# Patient Record
Sex: FEMALE | Race: BLACK OR AFRICAN AMERICAN | NOT HISPANIC OR LATINO | Employment: UNEMPLOYED | URBAN - METROPOLITAN AREA
[De-identification: names, ages, dates, MRNs, and addresses within clinical notes are randomized per-mention and may not be internally consistent; named-entity substitution may affect disease eponyms.]

---
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
## 2019-01-07 ENCOUNTER — HOSPITAL ENCOUNTER (EMERGENCY)
Facility: HOSPITAL | Age: 19
Discharge: HOME | End: 2019-01-07
Attending: EMERGENCY MEDICINE
Payer: MEDICAID

## 2019-01-07 VITALS
BODY MASS INDEX: 42.68 KG/M2 | TEMPERATURE: 99 F | HEIGHT: 64 IN | WEIGHT: 250 LBS | SYSTOLIC BLOOD PRESSURE: 130 MMHG | DIASTOLIC BLOOD PRESSURE: 65 MMHG | HEART RATE: 90 BPM | RESPIRATION RATE: 20 BRPM | OXYGEN SATURATION: 98 %

## 2019-01-07 DIAGNOSIS — B34.9 VIRAL SYNDROME: Primary | ICD-10-CM

## 2019-01-07 DIAGNOSIS — R11.0 NAUSEA: ICD-10-CM

## 2019-01-07 LAB
B-HCG UR QL: NEGATIVE
BACTERIA #/AREA URNS HPF: ABNORMAL /HPF
BILIRUB UR QL STRIP: NEGATIVE
CLARITY UR: ABNORMAL
COLOR UR: YELLOW
CTP QC/QA: YES
DEPRECATED S PYO AG THROAT QL EIA: NEGATIVE
FLUAV AG SPEC QL IA: NEGATIVE
FLUBV AG SPEC QL IA: NEGATIVE
GLUCOSE UR QL STRIP: NEGATIVE
HGB UR QL STRIP: NEGATIVE
KETONES UR QL STRIP: NEGATIVE
LEUKOCYTE ESTERASE UR QL STRIP: NEGATIVE
MICROSCOPIC COMMENT: ABNORMAL
NITRITE UR QL STRIP: NEGATIVE
PH UR STRIP: 8 [PH] (ref 5–8)
PROT UR QL STRIP: NEGATIVE
RBC #/AREA URNS HPF: 0 /HPF (ref 0–4)
SP GR UR STRIP: 1.01 (ref 1–1.03)
SPECIMEN SOURCE: NORMAL
SQUAMOUS #/AREA URNS HPF: 30 /HPF
URN SPEC COLLECT METH UR: ABNORMAL
UROBILINOGEN UR STRIP-ACNC: ABNORMAL EU/DL
WBC #/AREA URNS HPF: 0 /HPF (ref 0–5)

## 2019-01-07 PROCEDURE — 25000003 PHARM REV CODE 250: Performed by: PHYSICIAN ASSISTANT

## 2019-01-07 PROCEDURE — 87081 CULTURE SCREEN ONLY: CPT

## 2019-01-07 PROCEDURE — 81000 URINALYSIS NONAUTO W/SCOPE: CPT

## 2019-01-07 PROCEDURE — 25000003 PHARM REV CODE 250: Performed by: EMERGENCY MEDICINE

## 2019-01-07 PROCEDURE — 87400 INFLUENZA A/B EACH AG IA: CPT

## 2019-01-07 PROCEDURE — 99284 EMERGENCY DEPT VISIT MOD MDM: CPT | Mod: 25

## 2019-01-07 PROCEDURE — 81025 URINE PREGNANCY TEST: CPT | Performed by: PHYSICIAN ASSISTANT

## 2019-01-07 PROCEDURE — 87880 STREP A ASSAY W/OPTIC: CPT

## 2019-01-07 PROCEDURE — 96360 HYDRATION IV INFUSION INIT: CPT

## 2019-01-07 RX ORDER — ACETAMINOPHEN 500 MG
1000 TABLET ORAL
Status: COMPLETED | OUTPATIENT
Start: 2019-01-07 | End: 2019-01-07

## 2019-01-07 RX ORDER — HYOSCYAMINE SULFATE 0.12 MG/1
0.12 TABLET SUBLINGUAL
Status: COMPLETED | OUTPATIENT
Start: 2019-01-07 | End: 2019-01-07

## 2019-01-07 RX ORDER — ONDANSETRON 4 MG/1
4 TABLET, ORALLY DISINTEGRATING ORAL EVERY 8 HOURS PRN
Qty: 15 TABLET | Refills: 3 | OUTPATIENT
Start: 2019-01-07 | End: 2023-08-06

## 2019-01-07 RX ORDER — KETOROLAC TROMETHAMINE 10 MG/1
10 TABLET, FILM COATED ORAL
Status: COMPLETED | OUTPATIENT
Start: 2019-01-07 | End: 2019-01-07

## 2019-01-07 RX ORDER — IBUPROFEN 800 MG/1
800 TABLET ORAL EVERY 6 HOURS PRN
Qty: 20 TABLET | Refills: 0 | Status: SHIPPED | OUTPATIENT
Start: 2019-01-07 | End: 2019-01-10

## 2019-01-07 RX ORDER — SODIUM CHLORIDE 9 MG/ML
1000 INJECTION, SOLUTION INTRAVENOUS
Status: COMPLETED | OUTPATIENT
Start: 2019-01-07 | End: 2019-01-07

## 2019-01-07 RX ADMIN — SODIUM CHLORIDE 1000 ML: 0.9 INJECTION, SOLUTION INTRAVENOUS at 03:01

## 2019-01-07 RX ADMIN — HYOSCYAMINE SULFATE 0.12 MG: 0.12 TABLET ORAL; SUBLINGUAL at 03:01

## 2019-01-07 RX ADMIN — KETOROLAC TROMETHAMINE 10 MG: 10 TABLET, FILM COATED ORAL at 02:01

## 2019-01-07 RX ADMIN — ACETAMINOPHEN 1000 MG: 500 TABLET ORAL at 02:01

## 2019-01-07 NOTE — ED PROVIDER NOTES
Encounter Date: 1/7/2019    SCRIBE #1 NOTE: I, Edwige Amador, am scribing for, and in the presence of,  Dr. Aguilar. I have scribed the entire note.       History     Chief Complaint   Patient presents with    Nausea     with stomach ache, cough, headache, chills, for the past 2 days.        Mason Tatum is a 18 y.o. female who  has a past medical history of Cataracts, both eyes. Patient presents to the ED with complaints of nausea, fatigue, weakness, headache, cough, and generalized body aches that began 2 days ago. Patient denies any sore throat, diarrhea, or fever prior to visiting the ED. Pt denies abd pain, having normal BMs, passing flatus. Pt denies any suspicious food intake, recent travel when explicitly asked.               The history is provided by the patient.     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 No pertinent family history.  Social History     Tobacco Use    Smoking status: Never Smoker    Smokeless tobacco: Never Used   Substance Use Topics    Alcohol use: No    Drug use: No     Review of Systems   Constitutional: Positive for fatigue. Negative for chills and fever.   HENT: Negative for congestion, rhinorrhea and sore throat.    Eyes: Negative for redness and visual disturbance.   Respiratory: Positive for cough. Negative for shortness of breath and wheezing.    Cardiovascular: Negative for chest pain and palpitations.   Gastrointestinal: Positive for nausea. Negative for abdominal pain, diarrhea and vomiting.   Genitourinary: Negative for dysuria and hematuria.   Musculoskeletal: Positive for myalgias. Negative for back pain and neck pain.   Skin: Negative for rash.   Neurological: Positive for headaches. Negative for dizziness, weakness and light-headedness.    Psychiatric/Behavioral: Negative for confusion.       Physical Exam     Initial Vitals [01/07/19 1430]   BP Pulse Resp Temp SpO2   (!) 141/72 (!) 111 18 (!) 102.7 °F (39.3 °C) 99 %      MAP       --         Physical Exam    Nursing note and vitals reviewed.  Constitutional: She appears well-developed and well-nourished. She is not diaphoretic. No distress.   HENT:   Head: Normocephalic and atraumatic.   Mouth/Throat: Oropharynx is clear and moist.   Bilateral TM erythematous.  Posterior pharynx clear.  Moist mucus membranes.   No facial tenderness.  Nose normal.   Eyes: Conjunctivae and EOM are normal. Pupils are equal, round, and reactive to light.   Neck: Normal range of motion. Neck supple.   No anterior cervical adenopathy.   Cardiovascular: Regular rhythm and normal heart sounds. Exam reveals no gallop and no friction rub.    No murmur heard.  Tachycardic.   Pulmonary/Chest: Breath sounds normal. She has no wheezes. She has no rhonchi. She has no rales.   Abdominal: Soft. Bowel sounds are normal. There is no tenderness. There is no rebound and no guarding.   Musculoskeletal: Normal range of motion. She exhibits no edema or tenderness.   Lymphadenopathy:     She has no cervical adenopathy.   Neurological: She is alert and oriented to person, place, and time. She has normal strength.   Skin: Skin is warm and dry. Capillary refill takes less than 2 seconds. No rash noted.         ED Course   Procedures  Labs Reviewed   URINALYSIS, REFLEX TO URINE CULTURE - Abnormal; Notable for the following components:       Result Value    Appearance, UA Cloudy (*)     Urobilinogen, UA 4.0-6.0 (*)     All other components within normal limits    Narrative:     Preferred Collection Type->Urine, Clean Catch   URINALYSIS MICROSCOPIC - Abnormal; Notable for the following components:    Bacteria, UA Many (*)     All other components within normal limits    Narrative:     Preferred Collection Type->Urine, Clean Catch   THROAT  SCREEN, RAPID   CULTURE, STREP A,  THROAT   INFLUENZA A AND B ANTIGEN   POCT URINE PREGNANCY          Imaging Results          X-Ray Chest PA And Lateral (Final result)  Result time 01/07/19 16:14:03    Final result by Gab Green MD (01/07/19 16:14:03)                 Impression:      Normal radiographic appearance of the chest.      Electronically signed by: Gab Green MD  Date:    01/07/2019  Time:    16:14             Narrative:    EXAMINATION:  XR CHEST PA AND LATERAL    CLINICAL HISTORY:  Nausea    TECHNIQUE:  PA and lateral views of the chest were performed.    COMPARISON:  Chest radiograph 09/12/2012    FINDINGS:  The lungs are clear, with normal appearance of pulmonary vasculature and no pleural effusion or pneumothorax.    The cardiac silhouette is normal in size. The hilar and mediastinal contours are unremarkable.    Bones are intact. Resolution is somewhat limited by body habitus with underpenetration.  Included upper abdomen is within normal limits.                                 Medical Decision Making:   Clinical Tests:   Lab Tests: Ordered and Reviewed  ED Management:  - POCT urine pregnancy negative   - Urinalysis cloudy with many bacteria, cloudy in appearance, but many squamous cells, no nitrite, no leuks; pt not complaining of dysuria   - Influenza antigen negative   - Rapid Strep swab negative  - CXR negative for acute cardiopulmonary process   - Pt's fever resolved with administration of antipyretic   - Pt able to tolerate PO; pt is non-toxic appearing; mucous membranes moist; pt in no acute distress   - Pt's symptoms and complaints suggestive of likely viral syndrome    - Will discharge pt with rx for Zofran, ibuprofen  - No further intervention is indicated at this time after having taken into account the patient's history, physical exam findings, and empirical and objective data obtained during the patient's emergency department workup.   - The patient is at low risk for an emergent  medical condition at this time, and I am of the belief that that it is safe to discharge the patient from the emergency department.   - The patient is instructed to follow up as outpatient as indicated on the discharge paperwork.    - I have discussed the specifics of the workup with the patient and the patient has verbalized understanding of the details of the workup, the diagnosis, the treatment plan, and the need for outpatient follow-up.    - Although the patient has no emergent etiology today this does not preclude the development of an emergent condition so, in addition, I have advised the patient that they can return to the ED and/or activate EMS at any time with worsening of their symptoms, change of their symptoms, or with any other medical complaint.    - The patient remained comfortable and stable during their visit in the ED.    - Discharge and follow-up instructions discussed with the patient who expressed understanding and willingness to comply with my recommendations.  - Results of all emergency department tests  discussed thoroughly with patient; all patient questions answered; pt in agreement with plan  - Pt instructed to follow up with PCP in one week for recheck of today's complaints  - Pt given strict emergency department return precautions for any new or worsening of symptoms  - Pt discharged from the emergency department in stable condition, in no acute distress                          Clinical Impression:     1. Viral syndrome    2. Nausea            Disposition:   Disposition: Discharged  Condition: Stable       I, Chele Aguilar,  personally performed the services described in this documentation. All medical record entries made by the scribe were at my direction and in my presence.  I have reviewed the chart and agree that the record reflects my personal performance and is accurate and complete. Chele Aguilar M.D. 4:08 PM01/09/2019                 Chele Aguilar MD  01/09/19  [id number]

## 2019-01-07 NOTE — ED NOTES
APPEARANCE: Alert, oriented and in no acute distress.  CARDIAC: Tachycardic rate and regular rhythm, no murmur heard.   PERIPHERAL VASCULAR: peripheral pulses present. Normal cap refill. No edema. Warm to touch.  Obese  RESPIRATORY:Normal rate and effort, breath sounds clear bilaterally throughout chest. Respirations are equal and unlabored no obvious signs of distress.  GASTRO: soft, bowel sounds normal, no tenderness, no abdominal distention. Obese. Nauseated  MUSC: Full ROM. No bony tenderness or soft tissue tenderness. No obvious deformity.  SKIN: Skin is hot and dry, normal skin turgor, mucous membranes moist.  NEURO: 5/5 strength major flexors/extensors bilaterally. Sensory intact to light touch bilaterally. [place] coma scale: eyes open spontaneously-4, oriented & converses-5, obeys commands-6. No neurological abnormalities.   MENTAL STATUS: awake, alert and aware of environment.  EYE: PERRL, both eyes: pupils brisk and reactive to light. Normal size.  ENT: EARS: no obvious drainage. NOSE: no active bleeding.   Pt complains of nausea, fever and general malaise.

## 2019-01-10 LAB — BACTERIA THROAT CULT: NORMAL

## 2019-02-28 ENCOUNTER — HOSPITAL ENCOUNTER (EMERGENCY)
Facility: HOSPITAL | Age: 19
Discharge: HOME | End: 2019-02-28
Attending: EMERGENCY MEDICINE
Payer: MEDICAID

## 2019-02-28 VITALS
RESPIRATION RATE: 16 BRPM | TEMPERATURE: 99 F | DIASTOLIC BLOOD PRESSURE: 77 MMHG | HEIGHT: 63 IN | WEIGHT: 250 LBS | SYSTOLIC BLOOD PRESSURE: 142 MMHG | HEART RATE: 90 BPM | OXYGEN SATURATION: 98 % | BODY MASS INDEX: 44.3 KG/M2

## 2019-02-28 DIAGNOSIS — R51.9 INCREASED FREQUENCY OF HEADACHES: Primary | ICD-10-CM

## 2019-02-28 LAB
B-HCG UR QL: NEGATIVE
CTP QC/QA: YES

## 2019-02-28 PROCEDURE — 81025 URINE PREGNANCY TEST: CPT | Performed by: NURSE PRACTITIONER

## 2019-02-28 PROCEDURE — 25000003 PHARM REV CODE 250: Performed by: NURSE PRACTITIONER

## 2019-02-28 PROCEDURE — 99283 EMERGENCY DEPT VISIT LOW MDM: CPT

## 2019-02-28 RX ORDER — ACETAMINOPHEN 325 MG/1
650 TABLET ORAL
Status: COMPLETED | OUTPATIENT
Start: 2019-02-28 | End: 2019-02-28

## 2019-02-28 RX ORDER — PROPARACAINE HYDROCHLORIDE 5 MG/ML
2 SOLUTION/ DROPS OPHTHALMIC
Status: DISCONTINUED | OUTPATIENT
Start: 2019-02-28 | End: 2019-02-28

## 2019-02-28 RX ADMIN — ACETAMINOPHEN 650 MG: 325 TABLET ORAL at 02:02

## 2019-02-28 NOTE — ED NOTES
APPEARANCE: Alert, oriented and in no acute distress.  CARDIAC: Normal rate and rhythm, no murmur heard.   PERIPHERAL VASCULAR: peripheral pulses present. Normal cap refill. No edema. Warm to touch.    RESPIRATORY:Normal rate and effort, breath sounds clear bilaterally throughout chest. Respirations are equal and unlabored no obvious signs of distress.  GASTRO: soft, bowel sounds normal, no tenderness, no abdominal distention.  MUSC: Full ROM. No bony tenderness or soft tissue tenderness. No obvious deformity.  SKIN: Skin is warm and dry, normal skin turgor, mucous membranes moist.  NEURO: 5/5 strength major flexors/extensors bilaterally. Sensory intact to light touch bilaterally. [place] coma scale: eyes open spontaneously-4, oriented & converses-5, obeys commands-6. No neurological abnormalities.   MENTAL STATUS: awake, alert and aware of environment.  ENT: EARS: no obvious drainage. NOSE: no active bleeding.   Pt complains of headache. Saw eye doctor today.

## 2019-02-28 NOTE — ED PROVIDER NOTES
"Encounter Date: 2/28/2019       History     Chief Complaint   Patient presents with    Headache     Reports intermittent headaches over the past week. Was seen by eye doctor today and told she has a "possible edema".      19-year-old female presents the ED for left-sided headache. Over the past week she reports increased frequency of her usual headaches.  The headaches do resolve after taking Aleve.  She saw her eye doctor today and was diagnosed with "possible edema" and was referred to a "head doctor" for further evaluation.  She reports that the 1st available appointment is in April, so she came to the ED today to find out what is going on.  She denies trauma, fever, visual changes, weakness, numbness/tingling, photophobia, neck pain/stiffness, and rash. She is tolerating oral fluids without difficulty.  She drove herself to the ED.  The headache has been waxing and waning over the past week.  The most pain is located at the temporal an and orbital area.  She reports history of cataract in his eye and has had no visual change from her usual.  No other complaints at this time.      The history is provided by the patient.     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 No pertinent family history.  Social History     Tobacco Use    Smoking status: Never Smoker    Smokeless tobacco: Never Used   Substance Use Topics    Alcohol use: No    Drug use: No     Review of Systems   Constitutional: Negative for activity change, appetite change, chills and fever.   HENT: Negative for congestion.    Eyes: Negative for photophobia, pain, redness and visual disturbance.   Respiratory: Negative for cough.    Cardiovascular: Negative for chest pain.   Gastrointestinal: Negative for abdominal pain, diarrhea, nausea and vomiting. "   Musculoskeletal: Negative for back pain and neck pain.   Skin: Negative for rash.   Allergic/Immunologic: Negative for immunocompromised state.   Neurological: Positive for headaches. Negative for dizziness, weakness, light-headedness and numbness.   Hematological: Does not bruise/bleed easily.   Psychiatric/Behavioral: Negative for confusion.   All other systems reviewed and are negative.      Physical Exam     Initial Vitals [02/28/19 1302]   BP Pulse Resp Temp SpO2   (!) 142/77 90 16 99.1 °F (37.3 °C) 98 %      MAP       --         Physical Exam    Nursing note and vitals reviewed.  Constitutional: Vital signs are normal. She appears well-developed and well-nourished. She is Obese . She is active and cooperative. She is easily aroused.  Non-toxic appearance. She does not have a sickly appearance. She does not appear ill. No distress.   HENT:   Head: Normocephalic and atraumatic.   Right Ear: External ear normal.   Left Ear: External ear normal.   Nose: Nose normal.   Mouth/Throat: Uvula is midline, oropharynx is clear and moist and mucous membranes are normal.   Eyes: Conjunctivae, EOM and lids are normal. Pupils are equal, round, and reactive to light.   No temporal artery tenderness.    Neck: Normal range of motion. Neck supple.   Cardiovascular: Normal rate, regular rhythm and normal heart sounds.   Pulmonary/Chest: Effort normal and breath sounds normal.   Abdominal: Soft. Normal appearance and bowel sounds are normal.   Neurological: She is alert, oriented to person, place, and time and easily aroused. She has normal strength. No cranial nerve deficit or sensory deficit. Coordination and gait normal. GCS eye subscore is 4. GCS verbal subscore is 5. GCS motor subscore is 6.   Skin: Skin is warm, dry and intact. No bruising and no rash noted. No erythema.   Psychiatric: She has a normal mood and affect. Her speech is normal and behavior is normal. Judgment and thought content normal. Cognition and memory are  "normal.         ED Course   Procedures  Labs Reviewed   POCT URINE PREGNANCY          Imaging Results    None          Medical Decision Making:   Initial Assessment:   19-year-old female presents the ED after learning that she may have edema in her eye.  She saw her optometrist today and was referred to Ophthalmology.  She reports increased frequency of headaches over the past week.  No trauma. Headaches relieved with NSAIDs.  Patient appears well, nontoxic.  Vitals stable.  ED Management:  Exam, Tylenol  Other:   I have discussed this case with another health care provider.       <> Summary of the Discussion: Discussed with Dr. Del Rio, the patient's ophthalmologist.  He believes the patient may have a swollen optic nerve, and refer the patient to an optometrist.  He did not refer the patient to the ED. He believes the patient may f/u with optometry as scheduled in April.   No indication for labs or imaging.  I do not suspect ICH, intracranial mass, or pseudotumor cerebri.  Patient to follow up with her ophthalmologist as scheduled in April.  Reviewed return precautions including worsening or changing pain, visual changes, weakness, numbness/tingling, vomiting, rash, or if she has any questions or concerns..  Patient verbalized understanding, compliance, and agreement with the treatment plan.  The patient ambulated out of the ED with steady gait.                   ED Course as of Feb 28 1339   Thu Feb 28, 2019   1304 This is a SORT/MSE of a 19 y.o. female presenting to the ED with c/o intermittent headache times a week.  Patient states that she went to the eye doctor today and was told that she had "possible edema."  Pertinent exam findings remarkable for elevated blood pressure. Care will be transferred to an alternate provider when patient is roomed for a full evaluation and final disposition. NYDIA Fuller 1:04 PM   [CH]      ED Course User Index  [CH] Hayes Willis NP     Clinical Impression:       " ICD-10-CM ICD-9-CM   1. Increased frequency of headaches R51 784.0                                YORDY Rivas  02/28/19 1557    Patient not seen by me. Patient seen by midlevel only.     Diana Minaya MD  02/28/19 5581

## 2019-04-18 ENCOUNTER — TELEPHONE (OUTPATIENT)
Dept: OPHTHALMOLOGY | Facility: CLINIC | Age: 19
End: 2019-04-18

## 2019-04-25 ENCOUNTER — HOSPITAL ENCOUNTER (EMERGENCY)
Facility: HOSPITAL | Age: 19
Discharge: HOME | End: 2019-04-26
Payer: MEDICAID

## 2019-04-25 VITALS
OXYGEN SATURATION: 99 % | TEMPERATURE: 99 F | HEART RATE: 77 BPM | DIASTOLIC BLOOD PRESSURE: 58 MMHG | WEIGHT: 240 LBS | SYSTOLIC BLOOD PRESSURE: 130 MMHG | BODY MASS INDEX: 42.52 KG/M2 | HEIGHT: 63 IN | RESPIRATION RATE: 18 BRPM

## 2019-04-25 DIAGNOSIS — R07.9 CHEST PAIN: ICD-10-CM

## 2019-04-25 DIAGNOSIS — K21.9 GASTROESOPHAGEAL REFLUX DISEASE, ESOPHAGITIS PRESENCE NOT SPECIFIED: Primary | ICD-10-CM

## 2019-04-25 LAB
B-HCG UR QL: NEGATIVE
CTP QC/QA: YES

## 2019-04-25 PROCEDURE — 99284 EMERGENCY DEPT VISIT MOD MDM: CPT | Mod: 25

## 2019-04-25 PROCEDURE — 93010 EKG 12-LEAD: ICD-10-PCS | Mod: ,,, | Performed by: INTERNAL MEDICINE

## 2019-04-25 PROCEDURE — 81025 URINE PREGNANCY TEST: CPT | Performed by: NURSE PRACTITIONER

## 2019-04-25 PROCEDURE — 93010 ELECTROCARDIOGRAM REPORT: CPT | Mod: ,,, | Performed by: INTERNAL MEDICINE

## 2019-04-25 PROCEDURE — 25000003 PHARM REV CODE 250: Performed by: NURSE PRACTITIONER

## 2019-04-25 PROCEDURE — 93005 ELECTROCARDIOGRAM TRACING: CPT

## 2019-04-25 RX ORDER — OMEPRAZOLE 20 MG/1
20 CAPSULE, DELAYED RELEASE ORAL DAILY
Qty: 30 CAPSULE | Refills: 0 | Status: SHIPPED | OUTPATIENT
Start: 2019-04-25 | End: 2019-05-25

## 2019-04-25 RX ORDER — KETOROLAC TROMETHAMINE 30 MG/ML
15 INJECTION, SOLUTION INTRAMUSCULAR; INTRAVENOUS
Status: DISCONTINUED | OUTPATIENT
Start: 2019-04-25 | End: 2019-04-25

## 2019-04-25 RX ADMIN — LIDOCAINE HYDROCHLORIDE: 20 SOLUTION ORAL; TOPICAL at 11:04

## 2019-04-26 NOTE — ED PROVIDER NOTES
"Encounter Date: 4/25/2019       History     Chief Complaint   Patient presents with    Chest Pain     Complaining of intermittent chest pain x 1 week.  States it feels like a needle sticking her.     Patient is a 19-year-old female who has past medical history who presents ED for evaluation of intermittent midsternal chest pain x1 week.  Denies injury or trauma.  Describes the pain as a "needle" sticking her.  Associates a little shortness of breath when she lies down.  Denies SOB at this time.  Denies OCP use or recent travel.  Denies any family history of cardiac issues.  Denies taking any medications for pain. Patient states that the pain got worse tonight after eating "macaroni and cheese and drinking sweet tea."  Denies fever, chills, neck pain/stiffness, dizziness, headache, cough/congestion, sore throat, nausea, vomiting, diarrhea, abdominal pain, dysuria, back pain, leg swelling, or any other concerns.    The history is provided by the patient.     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 No pertinent family history.  Social History     Tobacco Use    Smoking status: Never Smoker    Smokeless tobacco: Never Used   Substance Use Topics    Alcohol use: No    Drug use: No     Review of Systems   Constitutional: Negative for chills and fever.   HENT: Negative for congestion and sore throat.    Respiratory: Negative for cough and shortness of breath.    Cardiovascular: Positive for chest pain. Negative for leg swelling.   Gastrointestinal: Negative for abdominal pain, diarrhea, nausea and vomiting.   Musculoskeletal: Negative for back pain, neck pain and neck stiffness.   Neurological: Negative for dizziness and headaches.   Hematological: Does not bruise/bleed easily.   All other systems reviewed and are " negative.      Physical Exam     Initial Vitals [04/25/19 2200]   BP Pulse Resp Temp SpO2   134/75 85 18 98.7 °F (37.1 °C) 99 %      MAP       --         Physical Exam    Vitals reviewed.  Constitutional: She appears well-developed and well-nourished.  Non-toxic appearance. She does not have a sickly appearance.   HENT:   Head: Atraumatic.   Mouth/Throat: Oropharynx is clear and moist.   Eyes: EOM are normal.   Neck: Normal range of motion, full passive range of motion without pain and phonation normal. Neck supple.   Cardiovascular: Regular rhythm.   No LE edema.   Pulmonary/Chest: Effort normal and breath sounds normal. No respiratory distress.   TTP to mid sternal chest, reproducible.  No signs of injury or trauma.  No ecchymosis or bruising.   Abdominal: Soft. Normal appearance and bowel sounds are normal. There is no tenderness.   Neurological: She is alert and oriented to person, place, and time.   Skin: Skin is warm.   Psychiatric: She has a normal mood and affect.         ED Course   Procedures  Labs Reviewed   POCT URINE PREGNANCY          Imaging Results          X-Ray Chest PA And Lateral (Final result)  Result time 04/25/19 22:42:13    Final result by Xavier Davis MD (04/25/19 22:42:13)                 Impression:      No acute intrathoracic process identified.      Electronically signed by: Xavier Davis MD  Date:    04/25/2019  Time:    22:42             Narrative:    EXAMINATION:  XR CHEST PA AND LATERAL    CLINICAL HISTORY:  Chest pain, unspecified    TECHNIQUE:  PA and lateral views of the chest were performed.    COMPARISON:  01/07/2019.    FINDINGS:  Cardiac silhouette is normal in size.  Lungs are symmetrically expanded.  No evidence of focal consolidative process, pneumothorax, or significant effusion.  No acute osseous abnormality identified.                                 Medical Decision Making:   History:   Old Medical Records: I decided to obtain old medical records.  Initial  Assessment:   Patient presents ED for evaluation of intermittent midsternal chest pain x1 week.  Denies injury or trauma.  Appears well, toxic.  Afebrile. VSS.   Clinical Tests:   Lab Tests: Ordered and Reviewed  Radiological Study: Reviewed and Ordered  ED Management:  UPT, CXR, EKG, GI cocktail   - UPT negative.  - CXR shows no abnormalities.  - EKG unremarkable.   - Low suspicion for ACS, heart score 0.  - Low suspicion for PE, PERC 0.  - Patient reports complete resolution of pain after GI cocktail given in Ed.  - I do not think that the origin of patient's pain is cardiac in nature.  - Patient's signs and symptoms most likely due to GERD.   - Patient is HDS and will be DC home with prescription for Prilosec.  - Patient instructed to f/u with PCP or [facility] in 1-2 days and to return ED for any concerns or worsening symptoms, such as fever, increased chest pain or shortness of breath, or nonstop vomiting.  - Patient verbalized understanding, compliance, and agreement with treatment plan.  Other:   I discussed test(s) with the performing physician.    Additional MDM:   PERC Rule:   Age is greater than or equal to 50 = 0.0  Heart Rate is greater than or equal to 100 = 0.0  SaO2 on room air < 95% = 0.0  Unilateral leg swelling = 0.0  Hemoptysis = 0.0  Recent surgery or trauma = 0.0  Prior PE or DVT =  0.0  Hormone use = 0.00  PERC Score = 0  Heart Score:    History:          Slightly suspicious.  ECG:             Normal  Age:               Less than 45 years  Risk factors: no risk factors known  Troponin:       Less than or equal to normal limit  Final Score: 0                    ED Course as of Apr 26 0009   Thu Apr 25, 2019   2342 11:42 PM- Patient reports complete resolution of chest pain after GI cocktail.      [CH]      ED Course User Index  [CH] Hayes Willis NP     Clinical Impression:       ICD-10-CM ICD-9-CM   1. Gastroesophageal reflux disease, esophagitis presence not specified  K21.9 530.81   2. Chest pain R07.9 786.50                                Hayes Willis NP  04/26/19 0015

## 2019-04-26 NOTE — ED TRIAGE NOTES
Pt presents complaining of chest pain since Friday. Pt states the pain is 7/10 and feels like needles are poking her midsternal and into her L anterior chest wall. Pt denies SOB, injury to the area, or any other complaints at this time

## 2019-04-26 NOTE — ED NOTES
"Pt resting in stretcher,on cellphone, rr even and unlabored bilaterally. Pt states "I'm feeling a lot better, my pain is probably a 5 now". Call light within reach  "

## 2019-04-26 NOTE — DISCHARGE INSTRUCTIONS
Take prescribed medication as labeled.  Follow up with your PCP in 1-2 days or return to ED for any concerns or worsening symptoms, such as fever, increased chest pain or shortness of breath, or nonstop vomiting.

## 2019-09-16 ENCOUNTER — HOSPITAL ENCOUNTER (EMERGENCY)
Facility: HOSPITAL | Age: 19
Discharge: HOME | End: 2019-09-16
Attending: EMERGENCY MEDICINE
Payer: MEDICAID

## 2019-09-16 VITALS
BODY MASS INDEX: 44.29 KG/M2 | RESPIRATION RATE: 18 BRPM | WEIGHT: 250 LBS | OXYGEN SATURATION: 99 % | TEMPERATURE: 98 F | DIASTOLIC BLOOD PRESSURE: 79 MMHG | HEART RATE: 78 BPM | SYSTOLIC BLOOD PRESSURE: 122 MMHG

## 2019-09-16 DIAGNOSIS — K59.00 CONSTIPATION, UNSPECIFIED CONSTIPATION TYPE: Primary | ICD-10-CM

## 2019-09-16 LAB
B-HCG UR QL: NEGATIVE
BACTERIA #/AREA URNS HPF: ABNORMAL /HPF
BILIRUB UR QL STRIP: NEGATIVE
CLARITY UR: ABNORMAL
COLOR UR: YELLOW
CTP QC/QA: YES
GLUCOSE UR QL STRIP: NEGATIVE
HGB UR QL STRIP: NEGATIVE
KETONES UR QL STRIP: NEGATIVE
LEUKOCYTE ESTERASE UR QL STRIP: NEGATIVE
MICROSCOPIC COMMENT: ABNORMAL
NITRITE UR QL STRIP: NEGATIVE
PH UR STRIP: 7 [PH] (ref 5–8)
PROT UR QL STRIP: NEGATIVE
SP GR UR STRIP: 1.01 (ref 1–1.03)
SQUAMOUS #/AREA URNS HPF: 10 /HPF
URN SPEC COLLECT METH UR: ABNORMAL
UROBILINOGEN UR STRIP-ACNC: ABNORMAL EU/DL
WBC #/AREA URNS HPF: 3 /HPF (ref 0–5)

## 2019-09-16 PROCEDURE — 81025 URINE PREGNANCY TEST: CPT | Performed by: EMERGENCY MEDICINE

## 2019-09-16 PROCEDURE — 81000 URINALYSIS NONAUTO W/SCOPE: CPT

## 2019-09-16 PROCEDURE — 99282 EMERGENCY DEPT VISIT SF MDM: CPT

## 2019-09-16 RX ORDER — POLYETHYLENE GLYCOL 3350 17 G/17G
17 POWDER, FOR SOLUTION ORAL DAILY
Refills: 0 | COMMUNITY
Start: 2019-09-16 | End: 2023-08-06

## 2019-09-16 RX ORDER — SYRING-NEEDL,DISP,INSUL,0.3 ML 29 G X1/2"
296 SYRINGE, EMPTY DISPOSABLE MISCELLANEOUS ONCE
Refills: 0 | COMMUNITY
Start: 2019-09-16 | End: 2019-09-16

## 2019-09-17 NOTE — DISCHARGE INSTRUCTIONS
If 1 bottle of mag citrate does not result in a large bowel movement, take another one 4 hours later

## 2019-09-17 NOTE — ED PROVIDER NOTES
Encounter Date: 9/16/2019       History     Chief Complaint   Patient presents with    Abdominal Pain     abdominal pain x 4 days denies emesis     19-year-old female with presents the emergency room with 4 day history of not having a bowel movement.  She has also been having abdominal pain.  She denies any nausea or vomiting. Patient states that her last bowel movement was Friday and that was very hard like small suzanne.    The history is provided by the patient.     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 No pertinent family history.  Social History     Tobacco Use    Smoking status: Never Smoker    Smokeless tobacco: Never Used   Substance Use Topics    Alcohol use: No    Drug use: No     Review of Systems   Constitutional: Negative for fever.   HENT: Negative for sore throat.    Respiratory: Negative for shortness of breath.    Cardiovascular: Negative for chest pain.   Gastrointestinal: Negative for nausea.   Genitourinary: Negative for dysuria, vaginal bleeding, vaginal discharge and vaginal pain.   Musculoskeletal: Negative for back pain.   Skin: Negative for rash.   Neurological: Negative for weakness.   Hematological: Does not bruise/bleed easily.   All other systems reviewed and are negative.    Physical Exam     Initial Vitals [09/16/19 1901]   BP Pulse Resp Temp SpO2   136/80 74 20 98 °F (36.7 °C) 99 %      MAP       --         Physical Exam    Nursing note and vitals reviewed.  Constitutional: She appears well-developed and well-nourished.   HENT:   Head: Normocephalic and atraumatic.   Eyes: Conjunctivae and EOM are normal. Pupils are equal, round, and reactive to light.   Neck: Normal range of motion.   Cardiovascular: Normal rate, regular rhythm, normal heart sounds and intact distal pulses. Exam  reveals no gallop and no friction rub.    No murmur heard.  Pulmonary/Chest: Breath sounds normal. No respiratory distress. She has no wheezes. She has no rhonchi. She has no rales. She exhibits no tenderness.   Abdominal: Soft. Bowel sounds are normal. She exhibits no distension and no mass. There is no tenderness. There is no rebound and no guarding.   No abdominal pain on exam   Neurological: She is alert and oriented to person, place, and time. She has normal strength. GCS score is 15. GCS eye subscore is 4. GCS verbal subscore is 5. GCS motor subscore is 6.       ED Course   Procedures  Labs Reviewed   URINALYSIS, REFLEX TO URINE CULTURE - Abnormal; Notable for the following components:       Result Value    Appearance, UA Cloudy (*)     Urobilinogen, UA 2.0-3.0 (*)     All other components within normal limits    Narrative:     Preferred Collection Type->Urine, Clean Catch   URINALYSIS MICROSCOPIC - Abnormal; Notable for the following components:    Bacteria Many (*)     All other components within normal limits    Narrative:     Preferred Collection Type->Urine, Clean Catch   POCT URINE PREGNANCY           Medical Decision Making:   Initial Assessment:   19-year-old female with presents the emergency room with 4 day history of not having a bowel movement.  She has also been having abdominal pain.  She denies any nausea or vomiting. Patient states that her last bowel movement was Friday and that was very hard like small suzanne.    Differential Diagnosis:   Gastroenteritis, gastritis, ulcer, cholecystitis, gallstones, pancreatitis, ileus, small bowel obstruction, appendicitis, constipation    Clinical Tests:   Lab Tests: Ordered and Reviewed  The following lab test(s) were unremarkable: UPT and Urinalysis  ED Management:  Patient examined has a normal exam.  Based on patient's history she has history consistent with constipation.  She has been advised to take Mag citrate and MiraLax to help alleviate her  constipation.  Patient has been advised to return to the emergency room she has no improvement in her constipation is unable to have a bowel movement within the next 24 hr. Patient given strict return precautions and voiced understanding of all discharge instructions. Pt was stable at discharge.                        ED Course as of Sep 16 2153   Mon Sep 16, 2019   2054 BP: 136/80 [LD]   2054 Temp: 98 °F (36.7 °C) [LD]   2054 Pulse: 74 [LD]   2054 Resp: 20 [LD]   2054 SpO2: 99 % [LD]   2113 Bacteria, UA(!): Many [AT]   2113 Appearance, UA(!): Cloudy [AT]   2113 UROBILINOGEN UA(!): 2.0-3.0 [AT]   2113 Preg Test, Ur: Negative [AT]      ED Course User Index  [AT] YORDY Payne  [LD] Huong Bradley MD     Clinical Impression:       ICD-10-CM ICD-9-CM   1. Constipation, unspecified constipation type K59.00 564.00                                YORDY Payne  09/16/19 2155

## 2020-12-30 ENCOUNTER — HOSPITAL ENCOUNTER (EMERGENCY)
Facility: HOSPITAL | Age: 20
Discharge: HOME | End: 2020-12-30
Attending: EMERGENCY MEDICINE
Payer: MEDICAID

## 2020-12-30 VITALS
OXYGEN SATURATION: 100 % | HEART RATE: 82 BPM | DIASTOLIC BLOOD PRESSURE: 71 MMHG | SYSTOLIC BLOOD PRESSURE: 116 MMHG | BODY MASS INDEX: 40.97 KG/M2 | RESPIRATION RATE: 20 BRPM | TEMPERATURE: 98 F | WEIGHT: 240 LBS | HEIGHT: 64 IN

## 2020-12-30 DIAGNOSIS — R07.9 CHEST PAIN: ICD-10-CM

## 2020-12-30 LAB
B-HCG UR QL: NEGATIVE
BASOPHILS # BLD AUTO: 0.04 K/UL (ref 0–0.2)
BASOPHILS NFR BLD: 0.7 % (ref 0–1.9)
BUN BLD-MCNC: 12 MG/DL (ref 6–20)
CA-I BLDV-SCNC: 1.13 MMOL/L (ref 1.06–1.42)
CHLORIDE BLD-SCNC: 104 MMOL/L (ref 95–110)
CO2 BLD-SCNC: 26 MMOL/L (ref 23–29)
CREATININE: 0.9 MG/DL (ref 0.5–1.4)
CTP QC/QA: YES
D DIMER PPP IA.FEU-MCNC: 0.22 MG/L FEU
DIFFERENTIAL METHOD: ABNORMAL
EOSINOPHIL # BLD AUTO: 0.1 K/UL (ref 0–0.5)
EOSINOPHIL NFR BLD: 0.9 % (ref 0–8)
ERYTHROCYTE [DISTWIDTH] IN BLOOD BY AUTOMATED COUNT: 12.7 % (ref 11.5–14.5)
GLUCOSE BLD-MCNC: 85 MG/DL (ref 70–110)
HCT VFR BLD AUTO: 38.8 % (ref 37–48.5)
HGB BLD-MCNC: 12.3 G/DL (ref 12–16)
IMM GRANULOCYTES # BLD AUTO: 0.01 K/UL (ref 0–0.04)
IMM GRANULOCYTES NFR BLD AUTO: 0.2 % (ref 0–0.5)
LYMPHOCYTES # BLD AUTO: 2.3 K/UL (ref 1–4.8)
LYMPHOCYTES NFR BLD: 41 % (ref 18–48)
MCH RBC QN AUTO: 27.6 PG (ref 27–31)
MCHC RBC AUTO-ENTMCNC: 31.7 G/DL (ref 32–36)
MCV RBC AUTO: 87 FL (ref 82–98)
MONOCYTES # BLD AUTO: 0.5 K/UL (ref 0.3–1)
MONOCYTES NFR BLD: 8.3 % (ref 4–15)
NEUTROPHILS # BLD AUTO: 2.8 K/UL (ref 1.8–7.7)
NEUTROPHILS NFR BLD: 48.9 % (ref 38–73)
NRBC BLD-RTO: 0 /100 WBC
PLATELET # BLD AUTO: 316 K/UL (ref 150–350)
PMV BLD AUTO: 10.6 FL (ref 9.2–12.9)
POTASSIUM BLD-SCNC: 3.9 MMOL/L (ref 3.5–5.1)
RBC # BLD AUTO: 4.46 M/UL (ref 4–5.4)
SODIUM BLD-SCNC: 139 MMOL/L (ref 136–145)
TROPONIN I SERPL DL<=0.01 NG/ML-MCNC: <0.006 NG/ML (ref 0–0.03)
WBC # BLD AUTO: 5.64 K/UL (ref 3.9–12.7)

## 2020-12-30 PROCEDURE — 84484 ASSAY OF TROPONIN QUANT: CPT

## 2020-12-30 PROCEDURE — 93010 EKG 12-LEAD: ICD-10-PCS | Mod: ,,, | Performed by: INTERNAL MEDICINE

## 2020-12-30 PROCEDURE — 99285 EMERGENCY DEPT VISIT HI MDM: CPT | Mod: 25

## 2020-12-30 PROCEDURE — 63600175 PHARM REV CODE 636 W HCPCS: Performed by: EMERGENCY MEDICINE

## 2020-12-30 PROCEDURE — 85025 COMPLETE CBC W/AUTO DIFF WBC: CPT

## 2020-12-30 PROCEDURE — 81025 URINE PREGNANCY TEST: CPT | Performed by: EMERGENCY MEDICINE

## 2020-12-30 PROCEDURE — 80047 BASIC METABLC PNL IONIZED CA: CPT

## 2020-12-30 PROCEDURE — 93010 ELECTROCARDIOGRAM REPORT: CPT | Mod: ,,, | Performed by: INTERNAL MEDICINE

## 2020-12-30 PROCEDURE — 96374 THER/PROPH/DIAG INJ IV PUSH: CPT

## 2020-12-30 PROCEDURE — 85379 FIBRIN DEGRADATION QUANT: CPT

## 2020-12-30 PROCEDURE — 93005 ELECTROCARDIOGRAM TRACING: CPT

## 2020-12-30 RX ORDER — KETOROLAC TROMETHAMINE 30 MG/ML
30 INJECTION, SOLUTION INTRAMUSCULAR; INTRAVENOUS
Status: COMPLETED | OUTPATIENT
Start: 2020-12-30 | End: 2020-12-30

## 2020-12-30 RX ADMIN — KETOROLAC TROMETHAMINE 30 MG: 30 INJECTION, SOLUTION INTRAMUSCULAR at 07:12

## 2021-10-01 ENCOUNTER — HOSPITAL ENCOUNTER (EMERGENCY)
Facility: HOSPITAL | Age: 21
Discharge: HOME | End: 2021-10-01
Attending: EMERGENCY MEDICINE
Payer: MEDICAID

## 2021-10-01 VITALS
HEIGHT: 64 IN | TEMPERATURE: 100 F | OXYGEN SATURATION: 100 % | SYSTOLIC BLOOD PRESSURE: 148 MMHG | BODY MASS INDEX: 44.39 KG/M2 | WEIGHT: 260 LBS | RESPIRATION RATE: 17 BRPM | HEART RATE: 93 BPM | DIASTOLIC BLOOD PRESSURE: 93 MMHG

## 2021-10-01 DIAGNOSIS — J06.9 VIRAL URI: Primary | ICD-10-CM

## 2021-10-01 LAB
CTP QC/QA: YES
GROUP A STREP, MOLECULAR: NEGATIVE
SARS-COV-2 RDRP RESP QL NAA+PROBE: NEGATIVE

## 2021-10-01 PROCEDURE — 99283 EMERGENCY DEPT VISIT LOW MDM: CPT | Mod: 25

## 2021-10-01 PROCEDURE — U0002 COVID-19 LAB TEST NON-CDC: HCPCS | Performed by: PHYSICIAN ASSISTANT

## 2021-10-01 PROCEDURE — 87651 STREP A DNA AMP PROBE: CPT | Performed by: EMERGENCY MEDICINE

## 2021-12-24 ENCOUNTER — HOSPITAL ENCOUNTER (EMERGENCY)
Facility: HOSPITAL | Age: 21
Discharge: HOME | End: 2021-12-24
Attending: EMERGENCY MEDICINE
Payer: MEDICAID

## 2021-12-24 VITALS
WEIGHT: 260 LBS | OXYGEN SATURATION: 98 % | HEART RATE: 113 BPM | TEMPERATURE: 102 F | SYSTOLIC BLOOD PRESSURE: 131 MMHG | BODY MASS INDEX: 44.39 KG/M2 | DIASTOLIC BLOOD PRESSURE: 89 MMHG | HEIGHT: 64 IN | RESPIRATION RATE: 20 BRPM

## 2021-12-24 DIAGNOSIS — R50.9 FEVER, UNSPECIFIED FEVER CAUSE: ICD-10-CM

## 2021-12-24 DIAGNOSIS — U07.1 COVID-19: Primary | ICD-10-CM

## 2021-12-24 LAB
CTP QC/QA: YES
CTP QC/QA: YES
GROUP A STREP, MOLECULAR: NEGATIVE
POC MOLECULAR INFLUENZA A AGN: NEGATIVE
POC MOLECULAR INFLUENZA B AGN: NEGATIVE
SARS-COV-2 RDRP RESP QL NAA+PROBE: POSITIVE

## 2021-12-24 PROCEDURE — U0002 COVID-19 LAB TEST NON-CDC: HCPCS | Performed by: PHYSICIAN ASSISTANT

## 2021-12-24 PROCEDURE — 25000003 PHARM REV CODE 250: Performed by: PHYSICIAN ASSISTANT

## 2021-12-24 PROCEDURE — 99284 EMERGENCY DEPT VISIT MOD MDM: CPT | Mod: 25

## 2021-12-24 PROCEDURE — 87651 STREP A DNA AMP PROBE: CPT | Performed by: PHYSICIAN ASSISTANT

## 2021-12-24 RX ORDER — BENZONATATE 200 MG/1
200 CAPSULE ORAL 3 TIMES DAILY PRN
Qty: 30 CAPSULE | Refills: 0 | Status: SHIPPED | OUTPATIENT
Start: 2021-12-24 | End: 2022-01-03

## 2021-12-24 RX ORDER — PROMETHAZINE HYDROCHLORIDE AND DEXTROMETHORPHAN HYDROBROMIDE 6.25; 15 MG/5ML; MG/5ML
5 SYRUP ORAL EVERY 4 HOURS PRN
Qty: 120 ML | Refills: 0 | Status: SHIPPED | OUTPATIENT
Start: 2021-12-24 | End: 2022-01-03

## 2021-12-24 RX ORDER — ACETAMINOPHEN 500 MG
1000 TABLET ORAL
Status: COMPLETED | OUTPATIENT
Start: 2021-12-24 | End: 2021-12-24

## 2021-12-24 RX ORDER — CETIRIZINE HYDROCHLORIDE 10 MG/1
10 TABLET ORAL DAILY
Qty: 30 TABLET | Refills: 0 | OUTPATIENT
Start: 2021-12-24 | End: 2023-08-06

## 2021-12-24 RX ADMIN — ACETAMINOPHEN 1000 MG: 500 TABLET ORAL at 09:12

## 2021-12-25 NOTE — ED PROVIDER NOTES
Encounter Date: 12/24/2021       History     Chief Complaint   Patient presents with    COVID-19 Concerns     Patient presents to the ED with reports of having body aches, fever, chills, cough, and sore throat.      HPI: Mason Tatum, a 21 y.o. female  has a past medical history of Cataracts, both eyes.     She presents to the ED evaluation of body aches and fever as well a chills.  Unvaccinated for covid.  Unsure of exposure status.  No treatments tried.          The history is provided by the patient.     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  Social History     Tobacco Use    Smoking status: Never Smoker    Smokeless tobacco: Never Used   Substance Use Topics    Alcohol use: No    Drug use: No     Review of Systems   Constitutional: Positive for fatigue and fever.   HENT: Positive for sore throat. Negative for congestion.    Respiratory: Positive for cough.    Gastrointestinal: Negative for nausea and vomiting.   Musculoskeletal: Positive for myalgias.   Allergic/Immunologic: Negative for immunocompromised state.   All other systems reviewed and are negative.      Physical Exam     Initial Vitals [12/24/21 2030]   BP Pulse Resp Temp SpO2   131/89 (!) 113 20 (!) 102.2 °F (39 °C) 98 %      MAP       --         Physical Exam    Nursing note and vitals reviewed.  Constitutional: She appears well-developed and well-nourished. She is not diaphoretic. She appears distressed (tearful ).   HENT:   Head: Normocephalic and atraumatic.   Right Ear: External ear normal.   Left Ear: External ear normal.   Nose: Nose normal.   Eyes: Conjunctivae and EOM are normal.   Neck:   Normal range of motion.  Cardiovascular: Normal rate and regular rhythm.   Pulmonary/Chest: No respiratory distress.   Musculoskeletal:          General: Normal range of motion.      Cervical back: Normal range of motion.     Neurological: She is alert and oriented to person, place, and time.   Skin: No rash noted.   Psychiatric: She has a normal mood and affect. Thought content normal.         ED Course   Procedures  Labs Reviewed   SARS-COV-2 RDRP GENE - Abnormal; Notable for the following components:       Result Value    POC Rapid COVID Positive (*)     All other components within normal limits    Narrative:     This test utilizes isothermal nucleic acid amplification   technology to detect the SARS-CoV-2 RdRp nucleic acid segment.   The analytical sensitivity (limit of detection) is 125 genome   equivalents/mL.   A POSITIVE result implies infection with the SARS-CoV-2 virus;   the patient is presumed to be contagious.     A NEGATIVE result means that SARS-CoV-2 nucleic acids are not   present above the limit of detection. A NEGATIVE result should be   treated as presumptive. It does not rule out the possibility of   COVID-19 and should not be the sole basis for treatment decisions.   If COVID-19 is strongly suspected based on clinical and exposure   history, re-testing using an alternate molecular assay should be   considered.   This test is only for use under the Food and Drug   Administration s Emergency Use Authorization (EUA).   Commercial kits are provided by [employer].   Performance characteristics of the EUA have been independently   verified by [facility] Department of   Pathology and Laboratory Medicine.   _________________________________________________________________   The authorized Fact Sheet for Healthcare Providers and the authorized Fact   Sheet for Patients of the ID NOW COVID-19 are available on the FDA   website:     https://www.fda.gov/media/345883/download  https://www.fda.gov/media/035378/download         GROUP A STREP, MOLECULAR   POCT INFLUENZA A/B MOLECULAR   POCT URINE PREGNANCY          Imaging Results     None          Medications   acetaminophen tablet 1,000 mg (1,000 mg Oral Given 12/24/21 2121)     Medical Decision Making:   Initial Assessment:   URI symptoms   ED Management:  Covid positive.  Vital signs do not indicate sepsis, hypoxia nor respiratory distress, and in my professional opinion the patient is well enough for discharge home. The patient was provided with discharge instructions on self-care and how to quarantine at home. I reinforced this advice and the dangers to family and public with failure to comply. We will proceed with symptomatic treatment. The patient was also given a return to work note, if applicable. Return precautions discussed with the patient. The patient expressed understanding to my instructions.                         Clinical Impression:   Final diagnoses:  [U07.1] COVID-19 (Primary)  [R50.9] Fever, unspecified fever cause          ED Disposition Condition    Discharge Stable        ED Prescriptions     Medication Sig Dispense Start Date End Date Auth. Provider    cetirizine (ZYRTEC) 10 MG tablet Take 1 tablet (10 mg total) by mouth once daily. 30 tablet 12/24/2021 1/23/2022 Yadira Parra PA-C    promethazine-dextromethorphan (PROMETHAZINE-DM) 6.25-15 mg/5 mL Syrp Take 5 mLs by mouth every 4 (four) hours as needed (cough). 120 mL 12/24/2021 1/3/2022 Yadira Parra PA-C    benzonatate (TESSALON) 200 MG capsule Take 1 capsule (200 mg total) by mouth 3 (three) times daily as needed for Cough. 30 capsule 12/24/2021 1/3/2022 Yadira Parra PA-C        Follow-up Information     Follow up With Specialties Details Why Contact Info    Petra Marie MD Neonatology   [address] [place] [address]  [phone]             Yadira Parra PA-C  12/24/21 2137

## 2021-12-27 ENCOUNTER — NURSE TRIAGE (OUTPATIENT)
Dept: ADMINISTRATIVE | Facility: CLINIC | Age: 21
End: 2021-12-27
Payer: MEDICAID

## 2021-12-27 ENCOUNTER — HOSPITAL ENCOUNTER (EMERGENCY)
Facility: HOSPITAL | Age: 21
Discharge: HOME | End: 2021-12-27
Attending: EMERGENCY MEDICINE
Payer: MEDICAID

## 2021-12-27 VITALS
HEART RATE: 96 BPM | HEIGHT: 64 IN | RESPIRATION RATE: 20 BRPM | DIASTOLIC BLOOD PRESSURE: 87 MMHG | SYSTOLIC BLOOD PRESSURE: 129 MMHG | BODY MASS INDEX: 44.39 KG/M2 | TEMPERATURE: 101 F | WEIGHT: 260 LBS | OXYGEN SATURATION: 98 %

## 2021-12-27 DIAGNOSIS — M54.6 ACUTE BILATERAL THORACIC BACK PAIN: Primary | ICD-10-CM

## 2021-12-27 PROCEDURE — 99281 EMR DPT VST MAYX REQ PHY/QHP: CPT

## 2021-12-27 RX ORDER — ACETAMINOPHEN 500 MG
1000 TABLET ORAL
Status: DISCONTINUED | OUTPATIENT
Start: 2021-12-27 | End: 2021-12-27 | Stop reason: HOSPADM

## 2021-12-27 NOTE — ED PROVIDER NOTES
Encounter Date: 12/27/2021       History     Chief Complaint   Patient presents with    Back Pain     Pt presents covid+ since Friday and c/o mid-back pain. back pain 9/10 pain and no OTC meds taken.      22 y/o female which presents with back pain that began yesterday. She tested positive on Friday. Denies SOB or chest pain. Told by nurse triage line to come to the ED for evaluation.     The history is provided by the patient.     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  Social History     Tobacco Use    Smoking status: Never Smoker    Smokeless tobacco: Never Used   Substance Use Topics    Alcohol use: No    Drug use: No     Review of Systems   Constitutional: Positive for fever.   HENT: Negative for sore throat.    Respiratory: Negative for shortness of breath.    Cardiovascular: Negative for chest pain.   Gastrointestinal: Negative for nausea.   Genitourinary: Negative for dysuria.   Musculoskeletal: Positive for myalgias. Negative for back pain.   Skin: Negative for rash.   Neurological: Negative for weakness.   Hematological: Does not bruise/bleed easily.   All other systems reviewed and are negative.      Physical Exam     Initial Vitals [12/27/21 0840]   BP Pulse Resp Temp SpO2   129/87 96 20 (!) 100.9 °F (38.3 °C) 98 %      MAP       --         Physical Exam    Nursing note and vitals reviewed.  Constitutional: She appears well-developed and well-nourished.   HENT:   Head: Normocephalic and atraumatic.   Eyes: Conjunctivae and EOM are normal. Pupils are equal, round, and reactive to light.   Cardiovascular: Normal rate, regular rhythm, normal heart sounds and intact distal pulses. Exam reveals no gallop and no friction rub.    No murmur heard.  Pulmonary/Chest: Breath sounds normal. No respiratory  distress. She has no wheezes. She has no rhonchi. She has no rales. She exhibits no tenderness.   Musculoskeletal:         General: Tenderness present. Normal range of motion.      Cervical back: Normal.        Back:      Neurological: She is alert. She has normal strength. GCS score is 15. GCS eye subscore is 4. GCS verbal subscore is 5. GCS motor subscore is 6.   Skin: Skin is warm. Capillary refill takes less than 2 seconds.         ED Course   Procedures  Labs Reviewed - No data to display       Imaging Results    None          Medications   acetaminophen tablet 1,000 mg (has no administration in time range)     Medical Decision Making:   Initial Assessment:   22 y/o with COVID which presents with back pain.   Differential Diagnosis:   Muscle aches, fever, covid symptoms  ED Management:  Pt examined and has myalgias consistently with COVID. She has been advised to take ibuprofen, tylenol and to stay hydrated. Patient given strict return precautions and voiced understanding of all discharge instructions. Pt was stable at discharge.                           Clinical Impression:   Final diagnoses:  [M54.6] Acute bilateral thoracic back pain (Primary)          ED Disposition Condition    Discharge Stable        ED Prescriptions     None        Follow-up Information     Follow up With Specialties Details Why Contact Info    Petra Marie MD Neonatology Schedule an appointment as soon as possible for a visit  As needed [address] [place] [id number] [phone]             Yadira Mon, YORDY  12/27/21 0935

## 2021-12-28 NOTE — ED NOTES
APPEARANCE: Alert, oriented and in no acute distress.  CARDIAC: Normal rate and rhythm, no murmur heard.   PERIPHERAL VASCULAR: peripheral pulses present. Normal cap refill. No edema. Warm to touch.    RESPIRATORY:Normal rate and effort, breath sounds clear bilaterally throughout chest. Respirations are equal and unlabored no obvious signs of distress.  GASTRO: soft, bowel sounds normal, no tenderness, no abdominal distention.  MUSC: Full ROM. No bony tenderness or soft tissue tenderness. No obvious deformity. Pt has mid, lower back pain. No other complaints Covid+ but stable.  SKIN: Skin is warm and dry, normal skin turgor, mucous membranes moist.  NEURO: 5/5 strength major flexors/extensors bilaterally. Sensory intact to light touch bilaterally. Gertrudis coma scale: eyes open spontaneously-4, oriented & converses-5, obeys commands-6. No neurological abnormalities.   MENTAL STATUS: awake, alert and aware of environment.  EYE: PERRL, both eyes: pupils brisk and reactive to light. Normal size.  ENT: EARS: no obvious drainage. NOSE: no active bleeding.   BREAST: symmetrical. No masses. No tenderness.  GENITALIA: Normal external genitalia.   Pt to be seen and discharged by provider soon.

## 2021-12-29 ENCOUNTER — HOSPITAL ENCOUNTER (EMERGENCY)
Facility: HOSPITAL | Age: 21
Discharge: HOME | End: 2021-12-29
Attending: EMERGENCY MEDICINE
Payer: MEDICAID

## 2021-12-29 VITALS
RESPIRATION RATE: 18 BRPM | DIASTOLIC BLOOD PRESSURE: 94 MMHG | HEART RATE: 80 BPM | BODY MASS INDEX: 42.91 KG/M2 | OXYGEN SATURATION: 99 % | SYSTOLIC BLOOD PRESSURE: 142 MMHG | WEIGHT: 250 LBS | TEMPERATURE: 98 F

## 2021-12-29 DIAGNOSIS — U07.1 COVID-19: Primary | ICD-10-CM

## 2021-12-29 PROCEDURE — 99282 EMERGENCY DEPT VISIT SF MDM: CPT

## 2021-12-29 NOTE — Clinical Note
"Mason"Elaine Tatum was seen and treated in our emergency department on 12/29/2021.     COVID-19 is present in our communities across the state. There is limited testing for COVID at this time, so not all patients can be tested. In this situation, your employee meets the following criteria:    Mason Tatum has met the criteria for COVID-19 testing and has a POSITIVE result. She can return to work once they are asymptomatic for 72 hours without the use of fever reducing medications AND at least ten days from the first positive result.     If you have any questions or concerns, or if I can be of further assistance, please do not hesitate to contact me.    Sincerely,             Trever Finley MD"

## 2021-12-29 NOTE — Clinical Note
"Mason"Elaine Tatum was seen and treated in our emergency department on 12/29/2021.  She may return to work on 12/30/2021.       If you have any questions or concerns, please don't hesitate to call.      ANTOLIN Reyes RN    "

## 2021-12-30 NOTE — DISCHARGE INSTRUCTIONS
If you have a COVID Test PENDING:  Please access [facility] to review the results of your test. Until the results of your COVID test return, you should isolate yourself so as not to potentially spread illness to others.   If your COVID test returns positive, you should isolate yourself so as not to spread illness to others. After five full days, if you are feeling better and you have not had fever for 24 hours, you can return to your typical daily activities, but you must wear a mask around others for an additional 5 days.   If your COVID test returns negative and you are either unvaccinated or more than six months out from your two-dose vaccine and are not yet boosted, you should still quarantine for 5 full days followed by strict mask use for an additional 5 full days.   If your COVID test returns negative and you have received your 2-dose initial vaccine as well as a booster, you should continue strict mask use for 10 full days after the exposure.  For all those exposed, best practice includes a test at day 5 after the exposure. This can be a home test or a test through one of the many testing centers throughout our community.   Masking is always advised to limit the spread of COVID. Cdc.gov is an excellent site to obtain the latest up to date recommendations regarding COVID and COVID testing.     After your evaluation today, we ruled out any emergent condition. However, if you develop shortness of breath, chest pain, or ANY OTHER CONCERNS please return to the emergency department for further care.      CDC Testing and Quarantine Guidelines for patients with exposure to a known-positive COVID-19 person:  A close exposure is defined as anyone who has had an exposure (masked or unmasked) to a known COVID -19 positive person within 6 feet of someone for a cumulative total of 15 minutes or more over a 24-hour period.   Vaccinated and/or if you recently had a positive covid test within 90 days do NOT need to  quarantine after contact with someone who had COVID-19 unless you develop symptoms.   Fully vaccinated people who have not had a positive test within 90 days, should get tested 3-5 days after their exposure, even if they don't have symptoms and wear a mask indoors in public for 14 days following exposure or until their test result is negative.      Unvaccinated and/or NOT had a positive test within 90 days and meet close exposure  You are required by CDC guidelines to quarantine for at least 5 days from time of exposure followed by 5 days of strict masking. It is recommended, but not required to test after 5 days, unless you develop symptoms, in which case you should test at that time.  If you get tested after 5 days and your test is positive, your 5 day period of isolation starts the day of the positive test.    If your exposure does not meet the above definition, you can return to your normal daily activities to include social distancing, wearing a mask and frequent handwashing.      Here is a link to guidance from the CDC:  https://www.cdc.gov/media/releases/2021/s1227-isolation-quarantine-guidance.html      [facility] Testing Sites:  https://ldh.[place].gov/page/3934      [facility] website with testing locations and guidance:  https://www.[facility].org/selfcare

## 2021-12-30 NOTE — ED PROVIDER NOTES
Tele Dispo Note    Video connection - adequate  Provider location - [place]  Patient location-  Emergency Department    HPI  Mason Tatum, a nontoxic/well appearing, 21 y.o. female, presented to the ED with request for note to return to work.  Patient was previously positive on 12/24/2021, now without any symptoms.  Patient denies URI complaints itches rhinorrhea, congestion, sore throat, as well as cough, shortness of breath, fever/chills/myalgias.  The patient was seen virtually to reduce the risk of COVID exposure. Pt did have the choice to see an in person provider but was agreeable to virtual visit.      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    Nursing/Ancillary staff note reviewed.    ROS  Negative except as documented in history of present illness.      PE  Vitals:   Vitals:    12/29/21 2002   BP: (!) 142/94   Pulse: 80   Resp: 18   Temp: 97.7 °F (36.5 °C)   TempSrc: Oral   SpO2: 99%   Weight: 113.4 kg (250 lb)        Gen:   Well developed, well-nourished in NAD.  Awake, alert and oriented.   Nontoxic appearing  Well developed, hydrated and nourished.     Psych:   Normal behavior, normal affect.    HENT:   Normocephalic, atraumatic  Mucous membranes pink and moist  No hot potato voice      Resp:   Normal respiratory effort, Speaking in full sentences  No increased work of breathing  No signs of respiratory distress    CV:   Pt denies any chest pain    Neuro:  The patient is awake, alert and oriented to person, place, and time with normal speech.  Memory is normal and thought process is intact.   No gait abnormalities are appreciated.    Skin:   No observed rashes/bruises.      DIFFERENTIAL DIAGNOSIS: After history and discussion with patient a  differential diagnosis was considered, but was not limited to viral URI including influenza type illness, coronavirus, bronchitis, bacterial/viral pneumonia,  or reactive airway disease.      IMPRESSION:  The encounter diagnosis was COVID-19.       The patient did not have any signs of respiratory distress on video chat. The patient was able to speak in complete sentences without difficulty breathing. The patient was well appearing.     PLAN: Pt stable for discharge.  Patient provided with discharge instructions including quarantine and return to Work guidance per CDC guidelines.  Patient also or provided a work note explaining to return per CDC guidelines.  At present time, CDC guidance has 5 days from symptom onset. Pt will also wear mask for additional 5 days.     Trever Finley MD  12/29/21 2019

## 2022-05-24 ENCOUNTER — HOSPITAL ENCOUNTER (EMERGENCY)
Facility: HOSPITAL | Age: 22
Discharge: HOME | End: 2022-05-24
Attending: EMERGENCY MEDICINE
Payer: MEDICAID

## 2022-05-24 VITALS
HEIGHT: 64 IN | BODY MASS INDEX: 44.39 KG/M2 | OXYGEN SATURATION: 100 % | WEIGHT: 260 LBS | DIASTOLIC BLOOD PRESSURE: 86 MMHG | HEART RATE: 77 BPM | SYSTOLIC BLOOD PRESSURE: 131 MMHG | RESPIRATION RATE: 20 BRPM | TEMPERATURE: 99 F

## 2022-05-24 DIAGNOSIS — R51.9 ACUTE NONINTRACTABLE HEADACHE, UNSPECIFIED HEADACHE TYPE: Primary | ICD-10-CM

## 2022-05-24 LAB
B-HCG UR QL: NEGATIVE
CTP QC/QA: YES
SARS-COV-2 RDRP RESP QL NAA+PROBE: NEGATIVE

## 2022-05-24 PROCEDURE — 81025 URINE PREGNANCY TEST: CPT | Performed by: PHYSICIAN ASSISTANT

## 2022-05-24 PROCEDURE — U0002 COVID-19 LAB TEST NON-CDC: HCPCS | Performed by: PHYSICIAN ASSISTANT

## 2022-05-24 PROCEDURE — 63600175 PHARM REV CODE 636 W HCPCS: Performed by: EMERGENCY MEDICINE

## 2022-05-24 PROCEDURE — 96372 THER/PROPH/DIAG INJ SC/IM: CPT | Performed by: EMERGENCY MEDICINE

## 2022-05-24 PROCEDURE — 99284 EMERGENCY DEPT VISIT MOD MDM: CPT

## 2022-05-24 RX ORDER — METOCLOPRAMIDE 10 MG/1
10 TABLET ORAL EVERY 6 HOURS PRN
Qty: 14 TABLET | Refills: 0 | OUTPATIENT
Start: 2022-05-24 | End: 2023-08-06

## 2022-05-24 RX ORDER — BUTALBITAL, ACETAMINOPHEN AND CAFFEINE 50; 325; 40 MG/1; MG/1; MG/1
1 TABLET ORAL EVERY 6 HOURS PRN
Qty: 14 TABLET | Refills: 0 | Status: SHIPPED | OUTPATIENT
Start: 2022-05-24 | End: 2022-06-23

## 2022-05-24 RX ORDER — PROCHLORPERAZINE EDISYLATE 5 MG/ML
10 INJECTION INTRAMUSCULAR; INTRAVENOUS
Status: COMPLETED | OUTPATIENT
Start: 2022-05-24 | End: 2022-05-24

## 2022-05-24 RX ORDER — KETOROLAC TROMETHAMINE 30 MG/ML
30 INJECTION, SOLUTION INTRAMUSCULAR; INTRAVENOUS
Status: COMPLETED | OUTPATIENT
Start: 2022-05-24 | End: 2022-05-24

## 2022-05-24 RX ADMIN — PROCHLORPERAZINE EDISYLATE 10 MG: 5 INJECTION INTRAMUSCULAR; INTRAVENOUS at 07:05

## 2022-05-24 RX ADMIN — KETOROLAC TROMETHAMINE 30 MG: 30 INJECTION, SOLUTION INTRAMUSCULAR at 07:05

## 2022-05-24 NOTE — FIRST PROVIDER EVALUATION
Emergency Department TeleTriage Encounter Note      CHIEF COMPLAINT    Chief Complaint   Patient presents with    Headache     Headache in occipital region with blurry vision no dizziness, no n/v. Reported back and neck pain and weakness. No numbness or tingling noted. Ambulated with steady gait       VITAL SIGNS   Initial Vitals [05/24/22 1526]   BP Pulse Resp Temp SpO2   131/86 79 20 98.8 °F (37.1 °C) 100 %      MAP       --            ALLERGIES    Review of patient's allergies indicates:  No Known Allergies    PROVIDER TRIAGE NOTE  HPI: Mason Tatum, a 22 y.o. female presents to the ED for evaluation of HA x 2  -  3 days.  No fevers.  Tried Advil with little improvement.      Constitutional: Vital signs WNL, well nourished, well developed, appearing stated age, NAD   HEENT: NCAT, symmetrical lids, No obvious facial deformity.  Normal phonation. Normal Conjunctiva, Gross EOMs intact   Neck: NAROM   Respiratory: Normal effort.  No obvious use of accessory muscles   Musculoskeletal: Moved upper extremities well   Neuro: Alert, answers questions appropriately    Psych: appropriate mood and affect          ORDERS  Labs Reviewed - No data to display    ED Orders (720h ago, onward)    None            Virtual Visit Note: The provider triage portion of this emergency department evaluation and documentation was performed via zoidu, a HIPAA-compliant telemedicine application, in concert with a tele-presenter in the room. A face to face patient evaluation with one of my colleagues will occur once the patient is placed in an emergency department room.      DISCLAIMER: This note was prepared with [employer] voice recognition transcription software. Garbled syntax, mangled pronouns, and other bizarre constructions may be attributed to that software system.

## 2022-05-25 NOTE — ED TRIAGE NOTES
Pt reports to the ED with a complaint of a HA by her eyes and blurred vision.     Patient identifiers verified and correct.     APPEARANCE: Patient not in acute distress.  NEURO: Awake, alert, appropriate for age, condition, and situation, pupils equal, round, and reactive.   HEENT: Head symmetrical. Eyes bilateral.  Bilateral ears without drainage. Bilateral nares patent, throat clear.  CARDIAC: Regular rate and rhythm  RESPIRATORY: Airway is open and patent. Respirations are normal and spontaneous on room air..   NEUROVASCULAR: All extremities are warm and pink. .  MUSCULOSKELETAL: Moves all extremities.   SKIN: Warm and dry, adequate turgor, mucus membranes moist and pink; no breakdown, lesions, or ecchymosis noted.     Will continue to monitor.

## 2022-05-25 NOTE — ED PROVIDER NOTES
Encounter Date: 5/24/2022       History     Chief Complaint   Patient presents with    Headache     Headache in occipital region with blurry vision no dizziness, no n/v. Reported back and neck pain and weakness. No numbness or tingling noted. Ambulated with steady gait     21 y/o F presents to the ED c/o HA. Onset a few days ago. Patient reports pain was intermittent but has been constant today and yesterday. She has been taking ibuprofen 200mg twice daily without relief. Reports a posterior and frontal headache that is aching in nature, without eliciting, exacerbating, or alleviating factors. No other symptoms reported.  Denies any nausea, vomiting, lightheadedness, dizziness, sore throat, congestion, cough, chest pain, shortness of breath, abdominal pain.  No other symptoms reported at this time.         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  Social History     Tobacco Use    Smoking status: Never Smoker    Smokeless tobacco: Never Used   Substance Use Topics    Alcohol use: No    Drug use: No     Review of Systems   Constitutional: Negative for chills, fatigue and fever.   HENT: Negative for congestion and sore throat.    Eyes: Negative for photophobia and visual disturbance.   Respiratory: Negative for cough and shortness of breath.    Cardiovascular: Negative for chest pain and palpitations.   Gastrointestinal: Negative for abdominal pain, diarrhea and vomiting.   Musculoskeletal: Negative for back pain, neck pain and neck stiffness.   Neurological: Positive for headaches. Negative for light-headedness and numbness.       Physical Exam     Initial Vitals [05/24/22 1526]   BP Pulse Resp Temp SpO2   131/86 79 20 98.8 °F (37.1 °C) 100 %      MAP       --         Physical Exam    Nursing note and vitals  reviewed.  Constitutional: She appears well-developed and well-nourished. No distress.   HENT:   Head: Normocephalic and atraumatic.   Eyes: Conjunctivae and EOM are normal. Pupils are equal, round, and reactive to light.   Neck: Neck supple. No tracheal deviation present.   Normal range of motion.  Cardiovascular: Normal rate and intact distal pulses.   Pulmonary/Chest: No respiratory distress.   Musculoskeletal:         General: No tenderness or edema. Normal range of motion.      Cervical back: Normal range of motion and neck supple.     Neurological: She is alert and oriented to person, place, and time. She has normal strength. No cranial nerve deficit. GCS score is 15. GCS eye subscore is 4. GCS verbal subscore is 5. GCS motor subscore is 6.   Skin: Skin is warm and dry.         ED Course   Procedures  Labs Reviewed   SARS-COV-2 RNA AMPLIFICATION, QUAL   POCT URINE PREGNANCY          Imaging Results    None          Medications   ketorolac injection 30 mg (30 mg Intramuscular Given 5/24/22 1900)   prochlorperazine injection Soln 10 mg (10 mg Intramuscular Given 5/24/22 1900)     Medical Decision Making:   Initial Assessment:   22-year-old female presents emergency department complaining of headache  Differential Diagnosis:   Migraine versus tension versus cluster versus sinus headache  Clinical Tests:   Lab Tests: Reviewed       <> Summary of Lab: COVID, UPT negative  ED Management:  Patient given IM Toradol and Compazine with significant improvement in symptoms.  Informed her of plan to discharge with prescriptions for Reglan and Fioricet, instructed on home management, follow up with primary care physician, strict return precautions.  Vital signs stable, patient comfortable with discharge at this time.                       Clinical Impression:   Final diagnoses:  [R51.9] Acute nonintractable headache, unspecified headache type (Primary)          ED Disposition Condition    Discharge Stable        ED  Prescriptions     Medication Sig Dispense Start Date End Date Auth. Provider    butalbital-acetaminophen-caffeine -40 mg (FIORICET, ESGIC) -40 mg per tablet Take 1 tablet by mouth every 6 (six) hours as needed for Pain. 14 tablet 5/24/2022 6/23/2022 Juan Nixon MD    metoclopramide HCl (REGLAN) 10 MG tablet Take 1 tablet (10 mg total) by mouth every 6 (six) hours as needed (Nausea). 14 tablet 5/24/2022  Juan Nixon MD        Follow-up Information     Follow up With Specialties Details Why Contact Info    Petra Marie MD Neonatology Schedule an appointment as soon as possible for a visit   [address] [place] [id number] [phone]             Juan iNxon MD  05/24/22 2002

## 2022-05-25 NOTE — DISCHARGE INSTRUCTIONS
I recommend taking ibuprofen 600 mg every 8 hours with food in addition to the prescribed medication as needed for headache.  Please arrange for follow-up appointment with your primary care physician for further evaluation and management.  Please return with any new or worsening symptoms.

## 2022-08-08 ENCOUNTER — HOSPITAL ENCOUNTER (EMERGENCY)
Facility: HOSPITAL | Age: 22
Discharge: HOME | End: 2022-08-08
Attending: EMERGENCY MEDICINE
Payer: MEDICAID

## 2022-08-08 VITALS
SYSTOLIC BLOOD PRESSURE: 141 MMHG | RESPIRATION RATE: 16 BRPM | OXYGEN SATURATION: 99 % | HEART RATE: 87 BPM | WEIGHT: 250 LBS | DIASTOLIC BLOOD PRESSURE: 84 MMHG | BODY MASS INDEX: 42.91 KG/M2 | TEMPERATURE: 99 F

## 2022-08-08 DIAGNOSIS — M54.50 BILATERAL LOW BACK PAIN, UNSPECIFIED CHRONICITY, UNSPECIFIED WHETHER SCIATICA PRESENT: ICD-10-CM

## 2022-08-08 DIAGNOSIS — R10.2 SUPRAPUBIC PAIN: ICD-10-CM

## 2022-08-08 DIAGNOSIS — N30.01 ACUTE CYSTITIS WITH HEMATURIA: Primary | ICD-10-CM

## 2022-08-08 LAB
ALBUMIN SERPL BCP-MCNC: 4.2 G/DL (ref 3.5–5.2)
ALP SERPL-CCNC: 66 U/L (ref 55–135)
ALT SERPL W/O P-5'-P-CCNC: 13 U/L (ref 10–44)
ANION GAP SERPL CALC-SCNC: 10 MMOL/L (ref 8–16)
AST SERPL-CCNC: 16 U/L (ref 10–40)
B-HCG UR QL: NEGATIVE
BACTERIA #/AREA URNS HPF: ABNORMAL /HPF
BASOPHILS # BLD AUTO: 0.05 K/UL (ref 0–0.2)
BASOPHILS NFR BLD: 0.7 % (ref 0–1.9)
BILIRUB SERPL-MCNC: 0.6 MG/DL (ref 0.1–1)
BILIRUB UR QL STRIP: NEGATIVE
BUN SERPL-MCNC: 18 MG/DL (ref 6–20)
CALCIUM SERPL-MCNC: 8.9 MG/DL (ref 8.7–10.5)
CHLORIDE SERPL-SCNC: 108 MMOL/L (ref 95–110)
CLARITY UR: ABNORMAL
CO2 SERPL-SCNC: 19 MMOL/L (ref 23–29)
COLOR UR: YELLOW
CREAT SERPL-MCNC: 1 MG/DL (ref 0.5–1.4)
CTP QC/QA: YES
DIFFERENTIAL METHOD: ABNORMAL
EOSINOPHIL # BLD AUTO: 0.1 K/UL (ref 0–0.5)
EOSINOPHIL NFR BLD: 1 % (ref 0–8)
ERYTHROCYTE [DISTWIDTH] IN BLOOD BY AUTOMATED COUNT: 12.8 % (ref 11.5–14.5)
EST. GFR  (NO RACE VARIABLE): >60 ML/MIN/1.73 M^2
GLUCOSE SERPL-MCNC: 89 MG/DL (ref 70–110)
GLUCOSE UR QL STRIP: NEGATIVE
HCT VFR BLD AUTO: 40.6 % (ref 37–48.5)
HGB BLD-MCNC: 12.6 G/DL (ref 12–16)
HGB UR QL STRIP: ABNORMAL
HYALINE CASTS #/AREA URNS LPF: 0 /LPF
IMM GRANULOCYTES # BLD AUTO: 0.01 K/UL (ref 0–0.04)
IMM GRANULOCYTES NFR BLD AUTO: 0.1 % (ref 0–0.5)
KETONES UR QL STRIP: NEGATIVE
LEUKOCYTE ESTERASE UR QL STRIP: ABNORMAL
LYMPHOCYTES # BLD AUTO: 2.6 K/UL (ref 1–4.8)
LYMPHOCYTES NFR BLD: 37.6 % (ref 18–48)
MCH RBC QN AUTO: 27.6 PG (ref 27–31)
MCHC RBC AUTO-ENTMCNC: 31 G/DL (ref 32–36)
MCV RBC AUTO: 89 FL (ref 82–98)
MICROSCOPIC COMMENT: ABNORMAL
MONOCYTES # BLD AUTO: 0.6 K/UL (ref 0.3–1)
MONOCYTES NFR BLD: 8.1 % (ref 4–15)
NEUTROPHILS # BLD AUTO: 3.7 K/UL (ref 1.8–7.7)
NEUTROPHILS NFR BLD: 52.5 % (ref 38–73)
NITRITE UR QL STRIP: NEGATIVE
NRBC BLD-RTO: 0 /100 WBC
PH UR STRIP: 6 [PH] (ref 5–8)
PLATELET # BLD AUTO: 260 K/UL (ref 150–450)
PMV BLD AUTO: 10.4 FL (ref 9.2–12.9)
POTASSIUM SERPL-SCNC: 4.4 MMOL/L (ref 3.5–5.1)
PROT SERPL-MCNC: 7.4 G/DL (ref 6–8.4)
PROT UR QL STRIP: ABNORMAL
RBC # BLD AUTO: 4.57 M/UL (ref 4–5.4)
RBC #/AREA URNS HPF: 47 /HPF (ref 0–4)
SODIUM SERPL-SCNC: 137 MMOL/L (ref 136–145)
SP GR UR STRIP: 1.03 (ref 1–1.03)
SQUAMOUS #/AREA URNS HPF: 25 /HPF
UNIDENT CRYS URNS QL MICRO: 3
URN SPEC COLLECT METH UR: ABNORMAL
UROBILINOGEN UR STRIP-ACNC: NEGATIVE EU/DL
WBC # BLD AUTO: 6.95 K/UL (ref 3.9–12.7)
WBC #/AREA URNS HPF: 57 /HPF (ref 0–5)

## 2022-08-08 PROCEDURE — 80053 COMPREHEN METABOLIC PANEL: CPT | Performed by: EMERGENCY MEDICINE

## 2022-08-08 PROCEDURE — 81025 URINE PREGNANCY TEST: CPT | Performed by: NURSE PRACTITIONER

## 2022-08-08 PROCEDURE — 99284 EMERGENCY DEPT VISIT MOD MDM: CPT | Mod: 25

## 2022-08-08 PROCEDURE — 81000 URINALYSIS NONAUTO W/SCOPE: CPT | Performed by: EMERGENCY MEDICINE

## 2022-08-08 PROCEDURE — 85025 COMPLETE CBC W/AUTO DIFF WBC: CPT | Performed by: EMERGENCY MEDICINE

## 2022-08-08 PROCEDURE — 87086 URINE CULTURE/COLONY COUNT: CPT | Performed by: EMERGENCY MEDICINE

## 2022-08-08 RX ORDER — CEPHALEXIN 500 MG/1
500 CAPSULE ORAL EVERY 12 HOURS
Qty: 14 CAPSULE | Refills: 0 | Status: SHIPPED | OUTPATIENT
Start: 2022-08-08 | End: 2022-08-15

## 2022-08-08 RX ORDER — PHENAZOPYRIDINE HYDROCHLORIDE 200 MG/1
200 TABLET, FILM COATED ORAL 3 TIMES DAILY
Qty: 6 TABLET | Refills: 0 | Status: SHIPPED | OUTPATIENT
Start: 2022-08-08 | End: 2022-08-10

## 2022-08-08 RX ORDER — KETOROLAC TROMETHAMINE 10 MG/1
10 TABLET, FILM COATED ORAL EVERY 6 HOURS PRN
Qty: 20 TABLET | Refills: 0 | OUTPATIENT
Start: 2022-08-08 | End: 2023-08-06

## 2022-08-08 NOTE — FIRST PROVIDER EVALUATION
Emergency Department TeleTriage Encounter Note      CHIEF COMPLAINT    Chief Complaint   Patient presents with    Dysuria     X 3 weeks, + abd pain, denies n/v + lower back pain        VITAL SIGNS   Initial Vitals [08/08/22 1625]   BP Pulse Resp Temp SpO2   (!) 141/84 87 16 98.6 °F (37 °C) 99 %      MAP       --            ALLERGIES    Review of patient's allergies indicates:  No Known Allergies    PROVIDER TRIAGE NOTE  This is a teletriage evaluation of a 22 y.o. female presenting to the ED complaining of dysuria, lower abd pain, and hematuria for three weeks. Denies fever, vaginal bleeding, and vaginal discharge. Has had lower back pain.     Well-appearing, afebrile. No distress.     Initial orders will be placed and care will be transferred to an alternate provider when patient is roomed for a full evaluation. Any additional orders and the final disposition will be determined by that provider.           ORDERS  Labs Reviewed   URINALYSIS, REFLEX TO URINE CULTURE   URINALYSIS, REFLEX TO URINE CULTURE   POCT URINE PREGNANCY       ED Orders (720h ago, onward)    Start Ordered     Status Ordering Provider    08/08/22 1750 08/08/22 1749  Urinalysis, Reflex to Urine Culture Urine, Clean Catch  STAT         Ordered PATY HINES N.    08/08/22 1750 08/08/22 1749  POCT urine pregnancy  Once         Ordered PATY HINES N.    08/08/22 1648 08/08/22 1648  Urinalysis, Reflex to Urine Culture Urine, Clean Catch  STAT         In process AB [facility]            Virtual Visit Note: The provider triage portion of this emergency department evaluation and documentation was performed via [employer], a HIPAA-compliant telemedicine application, in concert with a tele-presenter in the room. A face to face patient evaluation with one of my colleagues will occur once the patient is placed in an emergency department room.      DISCLAIMER: This note was prepared with M*Modal voice recognition transcription  software. Garbled syntax, mangled pronouns, and other bizarre constructions may be attributed to that software system.

## 2022-08-09 ENCOUNTER — PATIENT OUTREACH (OUTPATIENT)
Dept: EMERGENCY MEDICINE | Facility: HOSPITAL | Age: 22
End: 2022-08-09
Payer: MEDICAID

## 2022-08-09 NOTE — ED TRIAGE NOTES
Patient identifiers verified and correct.     APPEARANCE: Patient not in acute distress.  NEURO: Awake, alert, appropriate for age, condition, and situation, pupils equal, round, and reactive.   HEENT: Head symmetrical. Eyes bilateral.  Bilateral ears without drainage. Bilateral nares patent, throat clear.  CARDIAC: Regular rate and rhythm  RESPIRATORY: Airway is open and patent. Respirations are normal and spontaneous on room air.   NEUROVASCULAR: All extremities are warm and pink. .  MUSCULOSKELETAL: Moves all extremities.   SKIN: Warm and dry, adequate turgor, mucus membranes moist and pink; no breakdown, lesions, or ecchymosis noted.     Will continue to monitor.

## 2022-08-09 NOTE — ED PROVIDER NOTES
Encounter Date: 8/8/2022       History     Chief Complaint   Patient presents with    Dysuria     X 3 weeks, + abd pain, denies n/v + lower back pain      Patient is a very pleasant 22-year-old female who presents to the ED with complaint of dysuria.  Patient reports dysuria for the past 3 weeks with associated lower suprapubic abdominal pain and bilateral lower back pain.  She does report hematuria as well that is gradually subsided.  She denies any fever, nausea, vomiting or diarrhea.  She denies any history of kidney stones, urinary tract infection vaginal discharge or abnormal vaginal bleeding.  Patient states that she was initially prescribed Macrobid for her symptoms but without any significant improvement.        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  Social History     Tobacco Use    Smoking status: Never Smoker    Smokeless tobacco: Never Used   Substance Use Topics    Alcohol use: No    Drug use: No     Review of Systems   Respiratory: Negative for chest tightness and shortness of breath.    Cardiovascular: Negative for chest pain, palpitations and leg swelling.   Gastrointestinal: Positive for abdominal pain. Negative for nausea and vomiting.   Genitourinary: Positive for dysuria, frequency and hematuria. Negative for difficulty urinating, vaginal bleeding, vaginal discharge and vaginal pain.   Musculoskeletal: Positive for back pain.   Skin: Negative for pallor and rash.   Neurological: Negative for dizziness and headaches.   Psychiatric/Behavioral: Negative for agitation and confusion.       Physical Exam     Initial Vitals [08/08/22 1625]   BP Pulse Resp Temp SpO2   (!) 141/84 87 16 98.6 °F (37 °C) 99 %      MAP       --         Physical Exam    Nursing note and vitals  reviewed.  Constitutional: She appears well-developed and well-nourished.   HENT:   Head: Normocephalic and atraumatic.   Right Ear: External ear normal.   Left Ear: External ear normal.   Eyes: Conjunctivae and EOM are normal. Pupils are equal, round, and reactive to light.   Neck: Neck supple.   Normal range of motion.  Cardiovascular: Normal rate, regular rhythm, normal heart sounds and intact distal pulses.   Pulmonary/Chest: Breath sounds normal.   Abdominal: Abdomen is soft. Bowel sounds are normal.   No CVA tenderness bilaterally   Musculoskeletal:      Cervical back: Normal range of motion and neck supple.      Lumbar back: Tenderness present. No swelling, edema or spasms. Normal range of motion.        Back:       Comments: Mild tenderness to the bilateral lower paraspinal lumbar musculature; no skin changes or rashes appreciated on exam; no bony tenderness or step-offs appreciated on palpation of the lumbar spine     Neurological: She is alert and oriented to person, place, and time. She has normal strength. GCS score is 15. GCS eye subscore is 4. GCS verbal subscore is 5. GCS motor subscore is 6.   No focal neurological deficits   Strength 5/5   Sensation grossly in tact   MAEW   GCS 15   AAOx3    Skin: Skin is warm and dry. Capillary refill takes less than 2 seconds.   Psychiatric: She has a normal mood and affect.         ED Course   Procedures  Labs Reviewed   URINALYSIS, REFLEX TO URINE CULTURE - Abnormal; Notable for the following components:       Result Value    Appearance, UA Hazy (*)     Protein, UA 1+ (*)     Occult Blood UA 1+ (*)     Leukocytes, UA 3+ (*)     All other components within normal limits    Narrative:     Specimen Source->Urine   URINALYSIS MICROSCOPIC - Abnormal; Notable for the following components:    RBC, UA 47 (*)     WBC, UA 57 (*)     All other components within normal limits    Narrative:     Specimen Source->Urine   CBC W/ AUTO DIFFERENTIAL - Abnormal; Notable for the  following components:    MCHC 31.0 (*)     All other components within normal limits   COMPREHENSIVE METABOLIC PANEL - Abnormal; Notable for the following components:    CO2 19 (*)     All other components within normal limits   CULTURE, URINE   POCT URINE PREGNANCY          Imaging Results          CT Renal Stone Study ABD Pelvis WO (Final result)  Result time 08/08/22 20:44:11    Final result by Xavier Davis MD (08/08/22 20:44:11)                 Impression:      No acute intra-abdominal abnormalities identified.  No evidence of renal stones or obstructive uropathy.      Electronically signed by: Xavier Davis MD  Date:    08/08/2022  Time:    20:44             Narrative:    EXAMINATION:  CT RENAL STONE STUDY ABD PELVIS WO    CLINICAL HISTORY:  Flank pain, kidney stone suspected;    TECHNIQUE:  Low dose axial images, sagittal and coronal reformations were obtained from the lung bases to the pubic symphysis.  Contrast was not administered.    COMPARISON:  CT abdomen and pelvis from August 2010.    FINDINGS:  The visualized portion of the heart is unremarkable.  The lung bases are clear.    No significant hepatic abnormality seen on this noncontrast exam.  There is no intra-or extrahepatic biliary ductal dilatation.  The gallbladder is unremarkable.  The stomach, pancreas, spleen, and adrenal glands are unremarkable.    Kidneys show no evidence of stones or hydronephrosis. Ureters are difficult to track, however no stones are seen along their expected courses.  Urinary bladder is unremarkable.  Uterus is retroverted.  No significant adnexal abnormalities are seen.    Appendix is visualized and is unremarkable.  The visualized loops of small and large bowel show no evidence of obstruction or inflammation.  No free air or free fluid.    Aorta tapers normally.    No acute osseous abnormality identified. Small fat containing umbilical hernia is noted.                                 Medications - No data to  display  Medical Decision Making:   Clinical Tests:   Lab Tests: Reviewed and Ordered  Radiological Study: Ordered and Reviewed  ED Management:  - VSS; pt afebrile; NAD  - UPT negative   - CBC w/diff H/H stable; no significant leukocytosis  - CMP without significant electrolyte abnormality; renal function WNL  - UA consistent with infection(57 WBCs)  Hematuria (47 RBCs)   - CT renal stone study demonstrates no acute intra-abdominal abnormality; no findings of kidney stones, pyelonephritis or other more significant intra-abdominal abnormality per final radiology read  - UCx ordered, pending  - will switch from Macrobid to Keflex; will also provide patient with rx for pyridium, ketorolac  - No further intervention is indicated at this time after having taken into account the patient's history, physical exam findings, and empirical and objective data obtained during the patient's emergency department workup.   - The patient is at low risk for an emergent medical condition at this time, and I am of the belief that that it is safe to discharge the patient from the emergency department.   - The patient is instructed to follow up as outpatient as indicated on the discharge paperwork.    - I have discussed the specifics of the workup with the patient and the patient has verbalized understanding of the details of the workup, the diagnosis, the treatment plan, and the need for outpatient follow-up.    - Although the patient has no emergent etiology today this does not preclude the development of an emergent condition so, in addition, I have advised the patient that they can return to the ED and/or activate EMS at any time with worsening of their symptoms, change of their symptoms, or with any other medical complaint.    - The patient remained comfortable and stable during their visit in the ED.    - Discharge and follow-up instructions discussed with the patient who expressed understanding and willingness to comply with my  recommendations.  - Results of all emergency department tests  discussed thoroughly with patient; all patient questions answered; pt in agreement with plan  - Pt instructed to follow up with PCP in 2-3 days for recheck of today's complaints  - Pt given strict emergency department return precautions for any new or worsening of symptoms  - Pt discharged from the emergency department in stable condition, in no acute distress    -                       Clinical Impression:   Final diagnoses:  [N30.01] Acute cystitis with hematuria (Primary)  [R10.2] Suprapubic pain  [M54.50] Bilateral low back pain, unspecified chronicity, unspecified whether sciatica present          ED Disposition Condition    Discharge Stable        ED Prescriptions     Medication Sig Dispense Start Date End Date Auth. Provider    cephALEXin (KEFLEX) 500 MG capsule Take 1 capsule (500 mg total) by mouth every 12 (twelve) hours. for 7 days 14 capsule 8/8/2022 8/15/2022 Chele Aguilar MD    phenazopyridine (PYRIDIUM) 200 MG tablet Take 1 tablet (200 mg total) by mouth 3 (three) times daily. for 2 days 6 tablet 8/8/2022 8/10/2022 Chele Aguilar MD    ketorolac (TORADOL) 10 mg tablet Take 1 tablet (10 mg total) by mouth every 6 (six) hours as needed for Pain. 20 tablet 8/8/2022  Chele Aguilar MD        Follow-up Information     Follow up With Specialties Details Why Contact Info    Petra Marie MD Neonatology Schedule an appointment as soon as possible for a visit   [address] [place] [id number] [phone]             Chele Aguilar MD  08/08/22 2053       Chele Aguilar MD  08/08/22 2056

## 2022-08-10 LAB
BACTERIA UR CULT: NORMAL
BACTERIA UR CULT: NORMAL

## 2023-04-19 ENCOUNTER — PATIENT MESSAGE (OUTPATIENT)
Dept: RESEARCH | Facility: HOSPITAL | Age: 23
End: 2023-04-19
Payer: MEDICAID

## 2023-06-18 ENCOUNTER — OFFICE VISIT (OUTPATIENT)
Dept: URGENT CARE | Facility: CLINIC | Age: 23
End: 2023-06-18
Payer: MEDICAID

## 2023-06-18 VITALS
HEIGHT: 64 IN | RESPIRATION RATE: 18 BRPM | HEART RATE: 79 BPM | BODY MASS INDEX: 42.68 KG/M2 | OXYGEN SATURATION: 96 % | TEMPERATURE: 98 F | DIASTOLIC BLOOD PRESSURE: 81 MMHG | SYSTOLIC BLOOD PRESSURE: 116 MMHG | WEIGHT: 250 LBS

## 2023-06-18 DIAGNOSIS — J02.9 VIRAL PHARYNGITIS: Primary | ICD-10-CM

## 2023-06-18 LAB
CTP QC/QA: YES
CTP QC/QA: YES
MOLECULAR STREP A: NEGATIVE
SARS-COV-2 AG RESP QL IA.RAPID: NEGATIVE

## 2023-06-18 PROCEDURE — 87811 SARS-COV-2 COVID19 W/OPTIC: CPT | Mod: QW,S$GLB,, | Performed by: NURSE PRACTITIONER

## 2023-06-18 PROCEDURE — 87811 SARS CORONAVIRUS 2 ANTIGEN POCT, MANUAL READ: ICD-10-PCS | Mod: QW,S$GLB,, | Performed by: NURSE PRACTITIONER

## 2023-06-18 PROCEDURE — 87651 STREP A DNA AMP PROBE: CPT | Mod: QW,S$GLB,, | Performed by: NURSE PRACTITIONER

## 2023-06-18 PROCEDURE — 99203 OFFICE O/P NEW LOW 30 MIN: CPT | Mod: S$GLB,,, | Performed by: NURSE PRACTITIONER

## 2023-06-18 PROCEDURE — 87651 POCT STREP A MOLECULAR: ICD-10-PCS | Mod: QW,S$GLB,, | Performed by: NURSE PRACTITIONER

## 2023-06-18 PROCEDURE — 99203 PR OFFICE/OUTPT VISIT, NEW, LEVL III, 30-44 MIN: ICD-10-PCS | Mod: S$GLB,,, | Performed by: NURSE PRACTITIONER

## 2023-06-18 RX ORDER — IBUPROFEN 800 MG/1
800 TABLET ORAL EVERY 8 HOURS PRN
COMMUNITY
Start: 2023-05-29 | End: 2023-08-06

## 2023-06-18 RX ORDER — MUPIROCIN 20 MG/G
OINTMENT TOPICAL 3 TIMES DAILY
COMMUNITY
Start: 2023-03-06 | End: 2023-10-11

## 2023-06-18 RX ORDER — VALACYCLOVIR HYDROCHLORIDE 1 G/1
1000 TABLET, FILM COATED ORAL
COMMUNITY
Start: 2023-05-21 | End: 2023-10-11

## 2023-06-18 RX ORDER — AMOXICILLIN 500 MG/1
500 CAPSULE ORAL 3 TIMES DAILY
COMMUNITY
Start: 2023-05-29 | End: 2023-10-11

## 2023-06-18 RX ORDER — TRIAMCINOLONE ACETONIDE 1 MG/G
CREAM TOPICAL 2 TIMES DAILY
COMMUNITY
Start: 2023-05-10 | End: 2023-10-11

## 2023-06-18 NOTE — PROGRESS NOTES
"Subjective:      Patient ID: Mason Tatum is a 23 y.o. female.    Vitals:  height is 5' 4" (1.626 m) and weight is 113.4 kg (250 lb). Her temperature is 97.7 °F (36.5 °C). Her blood pressure is 116/81 and her pulse is 79. Her respiration is 18 and oxygen saturation is 96%.     Chief Complaint: Sore Throat    This is a 23 y.o. female who presents today with a chief complaint of sore throat, Patient presents to clinic with left side of throat pain x 3 days.  Patient took ibuprofen for the pain as she was already taking it along with antibiotics from having a tooth pulled two weeks ago.  Patient reports she had her tooth pulled and currently on amoxicillin and does have ibuprofen that she taking for pain, patient reports she did had her tooth pulled on the right side, and having sore throat on the left side, reports able to eat and drink normal, no hot potato voice, no drooling,  denies fever, body aches or chills, denies cough, wheezing or shortness of breath, denies nausea, vomiting, diarrhea or abdominal pain, denies chest pain or dizziness positional lightheadedness, denies sore throat or trouble swallowing, denies loss of taste or smell, or any other symptoms        Sore Throat   This is a new problem. The current episode started in the past 7 days. The problem has been gradually worsening. The pain is worse on the left side. There has been no fever. The pain is at a severity of 8/10. The pain is moderate. Associated symptoms include trouble swallowing. Pertinent negatives include no congestion, headaches or neck pain. She has tried acetaminophen for the symptoms. The treatment provided no relief.     HENT:  Positive for sore throat and trouble swallowing. Negative for congestion.    Neck: Negative for neck pain.   Neurological:  Negative for headaches.    Objective:     Physical Exam   Constitutional: She is oriented to person, place, and time. She appears well-developed. She is cooperative.  Non-toxic " appearance. She does not appear ill. No distress.   HENT:   Head: Normocephalic and atraumatic.   Ears:   Right Ear: Hearing, tympanic membrane, external ear and ear canal normal.   Left Ear: Hearing, tympanic membrane, external ear and ear canal normal.   Nose: Nose normal. No mucosal edema, rhinorrhea or nasal deformity. No epistaxis. Right sinus exhibits no maxillary sinus tenderness and no frontal sinus tenderness. Left sinus exhibits no maxillary sinus tenderness and no frontal sinus tenderness.   Mouth/Throat: Uvula is midline, oropharynx is clear and moist and mucous membranes are normal. No trismus in the jaw. Normal dentition. No uvula swelling. No oropharyngeal exudate, posterior oropharyngeal edema, posterior oropharyngeal erythema, tonsillar abscesses or cobblestoning.   No oropharyngeal erythema, edema or exudate noted, no Gokul's or trismus, uvula midline, able to tolerate secretions      Comments: No oropharyngeal erythema, edema or exudate noted, no Gokul's or trismus, uvula midline, able to tolerate secretions  Eyes: Conjunctivae and lids are normal. No scleral icterus.   Neck: Trachea normal and phonation normal. Neck supple. No edema present. No erythema present. No neck rigidity present.   Cardiovascular: Normal rate, regular rhythm, normal heart sounds and normal pulses.   Pulmonary/Chest: Effort normal and breath sounds normal. No respiratory distress. She has no decreased breath sounds. She has no rhonchi.   Abdominal: Normal appearance.   Musculoskeletal: Normal range of motion.         General: No deformity. Normal range of motion.   Lymphadenopathy:     She has no cervical adenopathy.   Neurological: She is alert and oriented to person, place, and time. She exhibits normal muscle tone. Coordination normal.   Skin: Skin is warm, dry, intact, not diaphoretic and not pale.   Psychiatric: Her speech is normal and behavior is normal. Judgment and thought content normal.   Nursing note and  vitals reviewed.  Results for orders placed or performed in visit on 06/18/23   POCT Strep A, Molecular   Result Value Ref Range    Molecular Strep A, POC Negative Negative     Acceptable Yes    SARS Coronavirus 2 Antigen, POCT Manual Read   Result Value Ref Range    SARS Coronavirus 2 Antigen Negative Negative     Acceptable Yes          Patient in no acute distress.  Vitals reassuring.  Discussed results/diagnosis/plan in depth with patient in clinic. Strict precautions given to patient to monitor for worsening signs and symptoms. Advised to follow up with primary.All questions answered. Strict ER precautions given. If your symptoms worsens or fail to improve you should go to the Emergency Room. Discharge and follow-up instructions given verbally/printed. Discharge and follow-up instructions discussed with the patient who expressed understanding and willingness to comply with my recommendations.Patient voiced understanding and in agreement with current treatment plan.     Please be advised this text was dictated with [employer] software and may contain errors due to translation.    Assessment:     1. Viral pharyngitis        Plan:       Viral pharyngitis  -     POCT Strep A, Molecular  -     SARS Coronavirus 2 Antigen, POCT Manual Read          Medical Decision Making:   Clinical Tests:   Lab Tests: Reviewed  Urgent Care Management:  Patient in no acute distress.  Vitals reassuring.  On exam, patient is nontoxic appearing and afebrile.  Lungs CTA.  No oropharyngeal erythema, edema or exudate noted.  No Gokul's or trismus.  Uvula midline.  Able to tolerate secretions.  Negative strep and COVID-19 results discussed with patient in detail.  Patient will continue the amoxicillin prescribed by dentist and the NSAIDs for pain.  Medication prescribed and over-the-counter medication discussed with patient at length.  Proper hydration advised.  I reiterated the importance of further evaluation  if no improvement symptoms and follow-up with primary. Patient voiced understanding and in agreement with current treatment plan.             Patient Instructions   PLEASE READ YOUR DISCHARGE INSTRUCTIONS ENTIRELY AS IT CONTAINS IMPORTANT INFORMATION.      Please drink plenty of fluids.    Please get plenty of rest.    Please return here or go to the Emergency Department for any concerns or worsening of condition.    Please take an over the counter antihistamine medication (allegra/Claritin/Zyrtec) of your choice as directed.    Try an over the counter decongestant like Mucinex D or Sudafed. You buy this behind the pharmacy counter    If you do have Hypertension or palpitations, it is safe to take Coricidin HBP for relief of sinus symptoms.    If not allergic, please take over the counter Tylenol (Acetaminophen) and/or Motrin (Ibuprofen) as directed for control of pain and/or fever.  Please follow up with your primary care doctor or specialist as needed.    Sore throat recommendations: Warm fluids, warm salt water gargles, throat lozenges, tea, honey, soup, rest, hydration.    Use over the counter flonase: one spray each nostril twice daily OR two sprays each nostril once daily.     If you  smoke, please stop smoking.      Please return or see your primary care doctor if you develop new or worsening symptoms.     Please arrange follow up with your primary medical clinic as soon as possible. You must understand that you've received an Urgent Care treatment only and that you may be released before all of your medical problems are known or treated. You, the patient, will arrange for follow up as instructed. If your symptoms worsen or fail to improve you should go to the Emergency Room.

## 2023-08-06 ENCOUNTER — HOSPITAL ENCOUNTER (EMERGENCY)
Facility: HOSPITAL | Age: 23
Discharge: HOME | End: 2023-08-06
Attending: EMERGENCY MEDICINE
Payer: MEDICAID

## 2023-08-06 VITALS
HEART RATE: 78 BPM | BODY MASS INDEX: 42.91 KG/M2 | WEIGHT: 250 LBS | TEMPERATURE: 98 F | RESPIRATION RATE: 18 BRPM | SYSTOLIC BLOOD PRESSURE: 174 MMHG | DIASTOLIC BLOOD PRESSURE: 86 MMHG | OXYGEN SATURATION: 99 %

## 2023-08-06 DIAGNOSIS — S39.012A STRAIN OF LUMBAR REGION, INITIAL ENCOUNTER: Primary | ICD-10-CM

## 2023-08-06 LAB
B-HCG UR QL: NEGATIVE
CTP QC/QA: YES

## 2023-08-06 PROCEDURE — 99284 EMERGENCY DEPT VISIT MOD MDM: CPT

## 2023-08-06 PROCEDURE — 81025 URINE PREGNANCY TEST: CPT | Performed by: EMERGENCY MEDICINE

## 2023-08-06 RX ORDER — NAPROXEN 500 MG/1
500 TABLET ORAL 2 TIMES DAILY WITH MEALS
Qty: 14 TABLET | Refills: 0 | Status: SHIPPED | OUTPATIENT
Start: 2023-08-06 | End: 2023-08-13

## 2023-08-06 RX ORDER — METHOCARBAMOL 500 MG/1
500 TABLET, FILM COATED ORAL 3 TIMES DAILY
Qty: 15 TABLET | Refills: 0 | Status: SHIPPED | OUTPATIENT
Start: 2023-08-06 | End: 2023-08-11

## 2023-08-06 RX ORDER — LIDOCAINE 50 MG/G
1 PATCH TOPICAL DAILY
Qty: 7 PATCH | Refills: 0 | Status: SHIPPED | OUTPATIENT
Start: 2023-08-06 | End: 2023-08-13

## 2023-08-07 NOTE — ED NOTES
Patient presents to the ED with c/o lower left back pain. 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 States it went away after a while. However, patient explains that when she bent down today the pain returned. Explains the pain to be only on the left sacral area. Describes the pain to be shooting and burning in nature. Patient ambulatory. Denies any issues with bowels or voiding. Updated on care plan. Awaiting orders. Wctm.

## 2023-08-07 NOTE — DISCHARGE INSTRUCTIONS
You have a muscle strain in your back.  You may take NSAIDs for pain for the next few days. If the pain does not improve, you may begin to take the muscle relaxant.  In addition to medication, begin gentle stretching exercises every day, and do your best to stay active to help with muscle tightness and pain.  You may also use hot showers/heating packs for muscle spasm and pain.  Follow-up with your primary care provider within the next week if your symptoms are not improving.  Return to the ED for any severe pain, weakness/numbness in your arms or legs, inability to walk, incontinence of urine or stool, or any other concerns.      Thank you for choosing [facility]!     Our goal in the Emergency Department is to always provide outstanding medical care. You may receive a survey by mail or e-mail in the next week regarding your experience today. We would greatly appreciate you completing and returning the survey. Your feedback provides us with a way to recognize our staff who provide very good care, and it helps us learn how to improve when your experience was below our aspiration of excellence.      It is important to remember that some problems are difficult to diagnose and may not be found during your first visit. Be sure to follow up with your primary care doctor and review any labs/imaging that was performed during your visit with them. If you do not have a primary care doctor, you may contact the one listed on your discharge paperwork, or you may also call the [facility] Appointment Desk at [phone] to schedule an appointment.     All medications may potentially have side effects and it is impossible to predict which medications may give you side effects. If you feel that you are having a negative effect of any medication you should immediately stop taking them and seek medical attention.  Do not drive or make any important decisions for 24 hours if you have received any pain medications,  sedatives or mood altering drugs during your ER visit.    We appreciate you trusting us with your medical care. We will be happy to take care of you for all of your future medical needs. You may return to the ER at any time for any new/concerning symptoms, worsening condition, or failure to improve. We hope you feel better soon.     Sincerely,    Luiz Fatima Jr., MD  Board-Certified Emergency Medicine Physician  [facility]

## 2023-08-07 NOTE — ED PROVIDER NOTES
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 female who has a past medical history of Cataracts, both eyes.    The patient presents to the ED due to L-sided low back pain.   Patient reports symptoms started 5 days ago while at work. 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 The pain improved gradually, but she states it got worse today. She feels the pain get worse with certain movements of her back. No associated fever, cough, CP, SOB, abdominal pain, leg pain, incontinence, or prioar back surgery. No other complaints or concerns.     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  Social History     Socioeconomic History    Marital status: Single   Tobacco Use    Smoking status: Never    Smokeless tobacco: Never   Substance and Sexual Activity    Alcohol use: No    Drug use: No     Review of patient's allergies indicates:  No Known Allergies    Review of Systems   Musculoskeletal:  Positive for back pain.       Physical Exam:     Initial Vitals [08/06/23 1832]   BP Pulse Resp Temp SpO2   (!) 174/86 78 18 98.1 °F (36.7 °C) 99 %      MAP       --         Physical Exam    Nursing note and vitals reviewed.  Constitutional: She appears well-developed and well-nourished. She is not diaphoretic. No distress.   HENT:   Head: Normocephalic and atraumatic.   Mouth/Throat: Oropharynx is clear and moist.   Eyes: EOM are normal. Pupils are equal, round, and reactive to light.   Neck: No tracheal deviation  present.   Cardiovascular:  Normal rate, regular rhythm, normal heart sounds and intact distal pulses.           Pulmonary/Chest: Breath sounds normal. No stridor. No respiratory distress. She has no wheezes.   Abdominal: Abdomen is soft. Bowel sounds are normal. She exhibits no distension and no mass. There is no abdominal tenderness.   Musculoskeletal:         General: No edema. Normal range of motion.      Cervical back: No bony tenderness.      Thoracic back: No bony tenderness.      Lumbar back: No bony tenderness.        Back:       Comments: Mild paraspinous muscle tenderness. No midline tenderness.       Neurological: She is alert and oriented to person, place, and time. She has normal strength. No cranial nerve deficit or sensory deficit.   Skin: Skin is warm and dry. Capillary refill takes less than 2 seconds. No pallor.   Psychiatric: She has a normal mood and affect. Her behavior is normal. Thought content normal.         ED Course:   Procedures    Medical Decision Making:    History Acquisition:   Additional historians utilized:  none    Prior medical records were reviewed:    visit 6/18 for viral pharyngitis    The patient's list of active medical problems, social history, medications, and allergies as documented has been reviewed.     Differential Diagnoses:   Based on available information and initial assessment, Differential Diagnosis includes, but is not limited to:  Cauda equina syndrome, diskitis/osteomyelitis, epidural/paraspinal abscess, AAA, aortic dissection, post-op/hardware infection, trauma/vertebral fracture, spinal cord injury, disc herniation, spinal stenosis, sciatica, radiculopathy, neoplasm, lumbar muscle strain, muscle spasm, neuropathic pain, UTI/pyelonephritis, nephrolithiasis.        EKG:       Labs:     Labs Reviewed   POCT URINE PREGNANCY     Independent review of the labs ordered include:   UPT negative    Imaging:     Imaging Results    None            Additional  Consideration:   Additional testing considered during clinical course: none    Social determinants of health considered during development of treatment plan include: poor access to care    Current co-morbidities considered which impacted clinical decision making: obesity    Case discussed with additional provider: none    Medications - No data to display          Medical Decision Making:   Initial Assessment:   22 yo F with L-sided back pain. No assocaited midline pain, fever, or infectious symptoms.   Vitals and exam benign.  Will DC with supportive care, recommend PCP f/u.  ED Management:  After complete evaluation, including thorough history and physical exam, the patient's symptoms are most likely due to muscle strain. There are no concerning features of bilateral weakness, significant new motor/sensory deficits, saddle anesthesia, or bowel/bladder incontinence to suggest acute cauda equina syndrome. On physical exam, there is no focal midline bony tenderness or evidence of significant trauma to suggest acute fracture or hematoma. There is no fever, history of recent surgery, or erythema/fluctuance to suggest acute diskitis, infection, or abscess. Will treat with supportive care. Discussed symptomatic and supportive care instructions, including use of heating pads, stretching and ROM exercises, improving posture, and slowly resuming activity as tolerated. Counseled on need to follow-up with PCP for further evaluation if symptoms persist, including further imaging as an outpatient if symptoms persist.      On re-evaluation, the patient's status has improved.  After complete ED evaluation, clinical impression is most consistent with muscle strain.  PCP follow-up as soon as possible was recommended.    After taking into careful account the patient's history, physical exam findings, as well as empirical and objective data obtained throughout ED workup, I feel no emergent medical condition has been identified. No  further evaluation or admission was felt to be required, and the patient is stable for discharge from the ED. The patient and any additional family present were updated with test results, overall clinical impression, and recommended further plan of care, including discharge instructions as provided and outpatient follow-up for continued evaluation and management as needed. All questions were answered. The patient expressed understanding and agreed with current plan for discharge and follow-up plan of care. Strict ED return precautions were provided, including return/worsening of current symptoms, new symptoms, or any other concerns.                 Clinical Impression:       ICD-10-CM ICD-9-CM   1. Strain of lumbar region, initial encounter  S39.012A 847.2       Discharge Medications:  Current Discharge Medication List        START taking these medications    Details   LIDOcaine (LIDODERM) 5 % Place 1 patch onto the skin once daily. Remove & Discard patch within 12 hours or as directed by MD for 7 days  Qty: 7 patch, Refills: 0      methocarbamoL (ROBAXIN) 500 MG Tab Take 1 tablet (500 mg total) by mouth 3 (three) times daily. for 5 days  Qty: 15 tablet, Refills: 0      naproxen (NAPROSYN) 500 MG tablet Take 1 tablet (500 mg total) by mouth 2 (two) times daily with meals. for 7 days  Qty: 14 tablet, Refills: 0               Follow-up Information       Follow up With Specialties Details Why Contact Info    Petra Marie MD Neonatology Schedule an appointment as soon as possible for a visit   [address] [place] [address]  [phone]               ED Disposition Condition    Discharge Stable               Luiz Fatima MD  08/07/23 2026

## 2023-10-10 ENCOUNTER — OFFICE VISIT (OUTPATIENT)
Dept: FAMILY MEDICINE | Facility: HOSPITAL | Age: 23
End: 2023-10-10
Payer: MEDICAID

## 2023-10-10 VITALS
SYSTOLIC BLOOD PRESSURE: 125 MMHG | BODY MASS INDEX: 47.8 KG/M2 | HEART RATE: 70 BPM | WEIGHT: 280 LBS | DIASTOLIC BLOOD PRESSURE: 85 MMHG | HEIGHT: 64 IN

## 2023-10-10 DIAGNOSIS — E66.01 CLASS 3 SEVERE OBESITY WITH BODY MASS INDEX (BMI) OF 45.0 TO 49.9 IN ADULT, UNSPECIFIED OBESITY TYPE, UNSPECIFIED WHETHER SERIOUS COMORBIDITY PRESENT: ICD-10-CM

## 2023-10-10 DIAGNOSIS — N64.4 PAIN OF LEFT BREAST: Primary | ICD-10-CM

## 2023-10-10 DIAGNOSIS — G43.019 INTRACTABLE MIGRAINE WITHOUT AURA AND WITHOUT STATUS MIGRAINOSUS: ICD-10-CM

## 2023-10-10 DIAGNOSIS — Z11.59 NEED FOR HEPATITIS C SCREENING TEST: ICD-10-CM

## 2023-10-10 DIAGNOSIS — Z11.4 SCREENING FOR HIV (HUMAN IMMUNODEFICIENCY VIRUS): ICD-10-CM

## 2023-10-10 DIAGNOSIS — N91.2 AMENORRHEA: ICD-10-CM

## 2023-10-10 DIAGNOSIS — Z11.3 SCREENING EXAMINATION FOR STD (SEXUALLY TRANSMITTED DISEASE): ICD-10-CM

## 2023-10-10 PROCEDURE — 99213 OFFICE O/P EST LOW 20 MIN: CPT | Performed by: STUDENT IN AN ORGANIZED HEALTH CARE EDUCATION/TRAINING PROGRAM

## 2023-10-10 RX ORDER — TOPIRAMATE 25 MG/1
25 TABLET ORAL 2 TIMES DAILY
Qty: 30 TABLET | Refills: 1 | Status: SHIPPED | OUTPATIENT
Start: 2023-10-10 | End: 2023-10-10

## 2023-10-10 RX ORDER — TOPIRAMATE 25 MG/1
25 TABLET ORAL DAILY
Qty: 30 TABLET | Refills: 1 | Status: SHIPPED | OUTPATIENT
Start: 2023-10-10 | End: 2023-10-25

## 2023-10-10 RX ORDER — NAPROXEN 500 MG/1
500 TABLET ORAL 2 TIMES DAILY PRN
Qty: 20 TABLET | Refills: 0 | Status: SHIPPED | OUTPATIENT
Start: 2023-10-10 | End: 2023-10-25 | Stop reason: SDUPTHER

## 2023-10-10 RX ORDER — SUMATRIPTAN SUCCINATE 25 MG/1
25 TABLET ORAL DAILY PRN
Qty: 10 TABLET | Refills: 0 | Status: SHIPPED | OUTPATIENT
Start: 2023-10-10 | End: 2023-10-25 | Stop reason: SDUPTHER

## 2023-10-10 NOTE — PROGRESS NOTES
"History & Physical  [facility]    Subjective:      Patient ID: Mason Tatum is a 23 y.o. female    Chief Complaint: Establish Care and Breast Pain (left)    Mason Tatum is a 23-year-old female with a PMHx B/L cataracts since birth presenting to establish care with a PCP and with acute complaints. Patient reports a 2 week history of breast pain, mostly located around her left nipple. Endorses this has never happened before and is concerned that she may have a mass in her breast.  Denies any trauma to the breast or nipple discharge. She also endorses a history of amenorrhea since she was a adolescence. Reports that over the past year her cycles have become regular with her last reported FDLMP on September 17th but prior to that she had never experienced a cycle and had never been evaluated before in the past. No history of pregnancy.     She also reports a chronic history with headaches. Reports these headaches have been occurring now for over 2 years in duration. Headaches are usually unilateral located over her temporal region and last anywhere from a few hours to 24 hours. Endorses associated nausea and worsening of headaches with bright lights, loud noises. Characterizes the headaches as "throbbing" and will have to stay still and avoid stimulation to prevent worsening of the headaches. She has used ibuprofen in the past which does help relieve the headaches.  She endorses the headaches occur anywhere from 3-4 days per week.    # Healthcare maintenance:  Cervical cancer screening: Last pap smear performed on  December 2022 NILM with other findings significant for Candida species and Trichomonas vaginalis.           Health Maintenance         Date Due Completion Date    COVID-19 Vaccine (1) Never done ---    TETANUS VACCINE Never done ---    Pap Smear Never done ---    Influenza Vaccine (1) 09/01/2023 10/25/2000            Past Medical History:   Diagnosis Date    Cataracts, both eyes     from " birth       Past Surgical History:   Procedure Laterality Date    WRIST SURGERY         History reviewed. No pertinent family history.    Social History     Socioeconomic History    Marital status: Single   Tobacco Use    Smoking status: Never    Smokeless tobacco: Never   Substance and Sexual Activity    Alcohol use: No    Drug use: No       Current Outpatient Medications   Medication Sig Dispense Refill    amoxicillin (AMOXIL) 500 MG capsule Take 500 mg by mouth 3 (three) times daily.      mupirocin (BACTROBAN) 2 % ointment Apply topically 3 (three) times daily.      naproxen (NAPROSYN) 500 MG tablet Take 1 tablet (500 mg total) by mouth 2 (two) times daily as needed (Migraine HA). 20 tablet 0    omeprazole (PRILOSEC) 20 MG capsule Take 1 capsule (20 mg total) by mouth once daily. 30 capsule 0    sumatriptan (IMITREX) 25 MG Tab Take 1 tablet (25 mg total) by mouth daily as needed (migraine HA). 10 tablet 0    topiramate (TOPAMAX) 25 MG tablet Take 1 tablet (25 mg total) by mouth once daily. 30 tablet 1    triamcinolone acetonide 0.1% (KENALOG) 0.1 % cream Apply topically 2 (two) times daily.      valACYclovir (VALTREX) 1000 MG tablet Take 1,000 mg by mouth.       No current facility-administered medications for this visit.       Review of patient's allergies indicates:  No Known Allergies    Review of Systems   Genitourinary:  Negative for difficulty urinating, dysuria, frequency and menstrual problem.   Neurological:  Positive for headaches.        Objective:      Vitals:    10/10/23 1313   BP: 125/85   Pulse: 70     BP Readings from Last 3 Encounters:   10/10/23 125/85   08/06/23 (!) 174/86   06/18/23 116/81     Body mass index is 48.06 kg/m².    Physical Exam  Vitals reviewed. Exam conducted with a chaperone present.   Constitutional:       Appearance: She is obese.   HENT:      Head: Normocephalic and atraumatic.   Cardiovascular:      Rate and Rhythm: Normal rate and regular rhythm.   Pulmonary:      Effort:  Pulmonary effort is normal.      Breath sounds: Normal breath sounds.   Chest:   Breasts:     Right: No mass or nipple discharge.      Left: No mass or nipple discharge.      Comments: Diffuse bilateral nodularity on palpation of b/l breasts characteristic of fibrocystic breast disease. Tenderness around the area of the left nipple. No underlying skin erythema.   Lymphadenopathy:      Upper Body:      Right upper body: No axillary adenopathy.      Left upper body: No axillary adenopathy.   Skin:     General: Skin is warm.      Findings: No rash.   Neurological:      Mental Status: She is alert and oriented to person, place, and time.   Psychiatric:         Mood and Affect: Mood normal.         Behavior: Behavior normal.         Assessment:     1. Pain of left breast    2. Hx of Amenorrhea    3. Intractable migraine without aura and without status migrainosus    4. Class 3 severe obesity with body mass index (BMI) of 45.0 to 49.9 in adult, unspecified obesity type, unspecified whether serious comorbidity present    5. Need for hepatitis C screening test    6. Screening for HIV (human immunodeficiency virus)    7. Screening examination for STD (sexually transmitted disease)       Plan:   Pain of left breast  -     US Breast Left Complete; Future; Expected date: 10/10/2023    Hx of Amenorrhea  -     Hemoglobin A1C; Future; Expected date: 10/10/2023  -     TSH; Future; Expected date: 10/10/2023  -     Follicle Stimulating Hormone; Future; Expected date: 10/10/2023  -     Luteinizing Hormone; Future; Expected date: 10/10/2023  -     Prolactin; Future; Expected date: 10/10/2023    Intractable migraine without aura and without status migrainosus  -     Discontinue: topiramate (TOPAMAX) 25 MG tablet; Take 1 tablet (25 mg total) by mouth 2 (two) times daily.  Dispense: 30 tablet; Refill: 1  -     naproxen (NAPROSYN) 500 MG tablet; Take 1 tablet (500 mg total) by mouth 2 (two) times daily as needed (Migraine HA).  Dispense:  20 tablet; Refill: 0  -     sumatriptan (IMITREX) 25 MG Tab; Take 1 tablet (25 mg total) by mouth daily as needed (migraine HA).  Dispense: 10 tablet; Refill: 0  -     topiramate (TOPAMAX) 25 MG tablet; Take 1 tablet (25 mg total) by mouth once daily.  Dispense: 30 tablet; Refill: 1    Class 3 severe obesity with body mass index (BMI) of 45.0 to 49.9 in adult, unspecified obesity type, unspecified whether serious comorbidity present  -     Hemoglobin A1C; Future; Expected date: 10/10/2023  -     Lipid Panel; Future; Expected date: 10/10/2023  -     TSH; Future; Expected date: 10/10/2023  -     Comprehensive Metabolic Panel; Future; Expected date: 02/07/2024  -     CBC Auto Differential; Future; Expected date: 10/10/2023    Need for hepatitis C screening test  -     Hepatitis C Antibody; Future; Expected date: 10/10/2023    Screening for HIV (human immunodeficiency virus)  -     HIV 1/2 Ag/Ab (4th Gen); Future; Expected date: 10/10/2023    Screening examination for STD (sexually transmitted disease)  -     Hepatitis C Antibody; Future; Expected date: 10/10/2023  -     HIV 1/2 Ag/Ab (4th Gen); Future; Expected date: 10/10/2023  -     C. trachomatis/N. gonorrhoeae by AMP DNA; Future; Expected date: 10/10/2023  -     Trichomonas vaginalis, RNA, Qual, Urine; Future; Expected date: 10/10/2023  -     RPR; Future; Expected date: 10/10/2023    PLAN:    - For further evaluation of left breast pain, will order a breast ultrasound to rule out breast abscess but likely symptoms as related to fibrocystic breast disease.  - For history of headaches, likely migraine headache given history of unilateral distribution, throbbing in character, associated nausea and photophobia, and lasting over 4 hours. Will plan to start prophylactic therapy with topiramate 25 mg daily and patient also provided naproxen with sumatriptan as abortive therapy.  - For history of amenorrhea, unclear etiology but possibly could be secondary to PCOS, ovarian  dysfunction, thyroid disease, diabetes, pituitary and hypothalamic dysfunction.  Will order labs including FSH, LH, and prolactin levels. For obesity, will order labs including a1c, TSH, and a lipid panel.   - Labs ordered for screening for STDs.     Follow-up in 1-2 months     Hubert Alvarado DO  [facility], PGY-3  10/10/2023      This note was partially created using [employer] Voice Recognition software. Typographical and content errors may occur with this process. While efforts are made to detect and correct such errors, in some cases errors will persist. For this reason, wording in this document should be considered in the proper context and not strictly verbatim

## 2023-10-13 ENCOUNTER — PATIENT MESSAGE (OUTPATIENT)
Dept: FAMILY MEDICINE | Facility: HOSPITAL | Age: 23
End: 2023-10-13
Payer: MEDICAID

## 2023-10-13 DIAGNOSIS — Z11.3 SCREENING FOR STD (SEXUALLY TRANSMITTED DISEASE): Primary | ICD-10-CM

## 2023-10-24 ENCOUNTER — HOSPITAL ENCOUNTER (OUTPATIENT)
Dept: RADIOLOGY | Facility: HOSPITAL | Age: 23
Discharge: HOME | End: 2023-10-24
Attending: STUDENT IN AN ORGANIZED HEALTH CARE EDUCATION/TRAINING PROGRAM
Payer: MEDICAID

## 2023-10-24 VITALS — HEIGHT: 64 IN | BODY MASS INDEX: 47.8 KG/M2 | WEIGHT: 280 LBS

## 2023-10-24 DIAGNOSIS — N64.4 PAIN OF LEFT BREAST: ICD-10-CM

## 2023-10-24 PROCEDURE — 76642 US BREAST LEFT LIMITED: ICD-10-PCS | Mod: 26,LT,, | Performed by: RADIOLOGY

## 2023-10-24 PROCEDURE — 76642 ULTRASOUND BREAST LIMITED: CPT | Mod: 26,LT,, | Performed by: RADIOLOGY

## 2023-10-24 PROCEDURE — 76642 ULTRASOUND BREAST LIMITED: CPT | Mod: TC,LT

## 2023-10-25 ENCOUNTER — OFFICE VISIT (OUTPATIENT)
Dept: FAMILY MEDICINE | Facility: HOSPITAL | Age: 23
End: 2023-10-25
Payer: MEDICAID

## 2023-10-25 VITALS
BODY MASS INDEX: 48.32 KG/M2 | SYSTOLIC BLOOD PRESSURE: 124 MMHG | HEIGHT: 64 IN | WEIGHT: 283.06 LBS | DIASTOLIC BLOOD PRESSURE: 83 MMHG | HEART RATE: 73 BPM

## 2023-10-25 DIAGNOSIS — G43.019 INTRACTABLE MIGRAINE WITHOUT AURA AND WITHOUT STATUS MIGRAINOSUS: ICD-10-CM

## 2023-10-25 DIAGNOSIS — E66.9 OBESITY, UNSPECIFIED CLASSIFICATION, UNSPECIFIED OBESITY TYPE, UNSPECIFIED WHETHER SERIOUS COMORBIDITY PRESENT: Primary | ICD-10-CM

## 2023-10-25 PROCEDURE — 99213 OFFICE O/P EST LOW 20 MIN: CPT | Performed by: STUDENT IN AN ORGANIZED HEALTH CARE EDUCATION/TRAINING PROGRAM

## 2023-10-25 RX ORDER — SUMATRIPTAN SUCCINATE 25 MG/1
25 TABLET ORAL DAILY PRN
Qty: 30 TABLET | Refills: 0 | Status: SHIPPED | OUTPATIENT
Start: 2023-10-25 | End: 2024-03-12

## 2023-10-25 RX ORDER — NAPROXEN 500 MG/1
500 TABLET ORAL 2 TIMES DAILY PRN
Qty: 30 TABLET | Refills: 0 | Status: SHIPPED | OUTPATIENT
Start: 2023-10-25 | End: 2024-03-11 | Stop reason: SDUPTHER

## 2023-11-03 NOTE — PROGRESS NOTES
Progress Note  [facility]    Subjective:      Patient ID: Mason Tatum is a 23 y.o. female    Chief Complaint: Follow-up    Mason Tatum is a 23-year-old female with a PMHx B/L cataracts since birth, obesity presenting for for follow-up and with additional complaints. Patient also presented last clinic visit for history of amenorrhea with unclear etiology but likely contributed to secondary to PCOS. Labs were ordered which were unremarkable.  Patient also was screened for STDs which was negative. She also was concerned about breast pain and a small mass but ultrasound was ordered and was unremarkable.     #Headache -chronic issue, unilateral distribution throbbing in character and associated numbness and photophobia with headaches lasting over 4 hours in duration.  Patient provided naproxen and sumatriptan as abortive therapy and topiramate 25 mg for prophylaxis.  Endorses the headaches have lessened in severity and these medications are effective but she is sexually active currently.    # obesity - current BMI 48.59, weight 283 lb.  Is interested in starting some type of medication to help with further weight loss.            Health Maintenance         Date Due Completion Date    COVID-19 Vaccine (1) Never done ---    TETANUS VACCINE Never done ---    Pap Smear Never done ---    Influenza Vaccine (1) 09/01/2023 10/25/2000            Review of Systems   Genitourinary:  Negative for difficulty urinating, dysuria, frequency and menstrual problem.   Neurological:  Positive for headaches.        Objective:      Vitals:    10/25/23 1559   BP: 124/83   Pulse: 73     BP Readings from Last 3 Encounters:   10/25/23 124/83   10/10/23 125/85   08/06/23 (!) 174/86     Body mass index is 48.59 kg/m².    Physical Exam  Vitals reviewed.   Constitutional:       Appearance: She is obese.   HENT:      Head: Normocephalic and atraumatic.   Cardiovascular:      Rate and Rhythm: Normal rate and regular rhythm.    Pulmonary:      Effort: Pulmonary effort is normal.      Breath sounds: Normal breath sounds.   Lymphadenopathy:      Upper Body:      Right upper body: No axillary adenopathy.      Left upper body: No axillary adenopathy.   Skin:     General: Skin is warm.      Findings: No rash.   Neurological:      Mental Status: She is alert and oriented to person, place, and time.   Psychiatric:         Mood and Affect: Mood normal.         Behavior: Behavior normal.       Assessment/Plan:     Mason was seen today for follow-up.    Diagnoses and all orders for this visit:    Obesity, unspecified classification, unspecified obesity type, unspecified whether serious comorbidity present    Intractable migraine without aura and without status migrainosus  -     sumatriptan (IMITREX) 25 MG Tab; Take 1 tablet (25 mg total) by mouth daily as needed (migraine HA).  -     naproxen (NAPROSYN) 500 MG tablet; Take 1 tablet (500 mg total) by mouth 2 (two) times daily as needed (Migraine HA).    Given patient is sexually active now, will stop Topamax therapy. Patient provided information on birth control methods and advised to follow-up in 2 weeks to consider birth control before starting any type of medications. If patient does proceed to start birth control can consider starting Topamax and Adipex for chronic treatment of obesity and potentially benefit for migraine prophylaxis.    Follow-up:  2 weeks    Hubert Alvarado DO  [facility], PGY-3  10/25/2023      This note was partially created using [employer] Voice Recognition software. Typographical and content errors may occur with this process. While efforts are made to detect and correct such errors, in some cases errors will persist. For this reason, wording in this document should be considered in the proper context and not strictly verbatim

## 2023-11-03 NOTE — PROGRESS NOTES
I assume primary medical responsibility for this patient. I have reviewed the history, physical, and assessment & treatment plan with the resident and agree that the care is reasonable and necessary. This service has been performed by a resident without the presence of a teaching physician under the primary care exception. If necessary, an addendum of additional findings or evaluation beyond the resident documentation will be noted below.        Carlos Russell Jr., DO    [facility]

## 2024-02-26 ENCOUNTER — OFFICE VISIT (OUTPATIENT)
Dept: OBSTETRICS AND GYNECOLOGY | Facility: CLINIC | Age: 24
End: 2024-02-26
Payer: MEDICAID

## 2024-02-26 VITALS
BODY MASS INDEX: 47.09 KG/M2 | WEIGHT: 274.38 LBS | SYSTOLIC BLOOD PRESSURE: 127 MMHG | DIASTOLIC BLOOD PRESSURE: 85 MMHG

## 2024-02-26 DIAGNOSIS — Z72.89 OTHER PROBLEMS RELATED TO LIFESTYLE: ICD-10-CM

## 2024-02-26 DIAGNOSIS — Z11.3 SCREENING EXAMINATION FOR VENEREAL DISEASE: ICD-10-CM

## 2024-02-26 DIAGNOSIS — N92.6 IRREGULAR PERIODS/MENSTRUAL CYCLES: ICD-10-CM

## 2024-02-26 DIAGNOSIS — Z01.419 WELL WOMAN EXAM WITH ROUTINE GYNECOLOGICAL EXAM: Primary | ICD-10-CM

## 2024-02-26 PROCEDURE — 88175 CYTOPATH C/V AUTO FLUID REDO: CPT | Performed by: OBSTETRICS & GYNECOLOGY

## 2024-02-26 PROCEDURE — 1159F MED LIST DOCD IN RCRD: CPT | Mod: CPTII,,, | Performed by: OBSTETRICS & GYNECOLOGY

## 2024-02-26 PROCEDURE — 1160F RVW MEDS BY RX/DR IN RCRD: CPT | Mod: CPTII,,, | Performed by: OBSTETRICS & GYNECOLOGY

## 2024-02-26 PROCEDURE — 3079F DIAST BP 80-89 MM HG: CPT | Mod: CPTII,,, | Performed by: OBSTETRICS & GYNECOLOGY

## 2024-02-26 PROCEDURE — 87491 CHLMYD TRACH DNA AMP PROBE: CPT | Performed by: OBSTETRICS & GYNECOLOGY

## 2024-02-26 PROCEDURE — 3008F BODY MASS INDEX DOCD: CPT | Mod: CPTII,,, | Performed by: OBSTETRICS & GYNECOLOGY

## 2024-02-26 PROCEDURE — 99999 PR PBB SHADOW E&M-EST. PATIENT-LVL II: CPT | Mod: PBBFAC,,, | Performed by: OBSTETRICS & GYNECOLOGY

## 2024-02-26 PROCEDURE — 99385 PREV VISIT NEW AGE 18-39: CPT | Mod: S$PBB,,, | Performed by: OBSTETRICS & GYNECOLOGY

## 2024-02-26 PROCEDURE — 3074F SYST BP LT 130 MM HG: CPT | Mod: CPTII,,, | Performed by: OBSTETRICS & GYNECOLOGY

## 2024-02-26 PROCEDURE — 99212 OFFICE O/P EST SF 10 MIN: CPT | Mod: PBBFAC,PO | Performed by: OBSTETRICS & GYNECOLOGY

## 2024-02-26 NOTE — PROGRESS NOTES
HPI:   24 y.o.   OB History    No obstetric history on file.      Patient's last menstrual period was 02/03/2024 (approximate).    Patient is  here for her annual gynecologic exam.  She has no complaints.     ROS:  GENERAL: No fever, chills, fatigability or weight loss.  SKIN: No rashes, itching or changes in color or texture of skin.  HEAD: No headaches or recent head trauma.  EYES: Visual acuity fine. No photophobia, ocular pain or diplopia.  EARS: Denies ear pain, discharge or vertigo.  NOSE: No loss of smell, no epistaxis or postnasal drip.  MOUTH & THROAT: No hoarseness or change in voice. No excessive gum bleeding.  NODES: Denies swollen glands.  CHEST: Denies IVY, cyanosis, wheezing, cough and sputum production.  CARDIOVASCULAR: Denies chest pain, PND, orthopnea or reduced exercise tolerance.  ABDOMEN: Appetite fine. No weight loss. Denies diarrhea, abdominal pain, hematemesis or blood in stool.  URINARY: No flank pain, dysuria or hematuria.  PERIPHERAL VASCULAR: No claudication or cyanosis.  MUSCULOSKELETAL: No joint stiffness or swelling. Denies back pain.  NEUROLOGIC: No history of seizures, paralysis, alteration of gait or coordination.    PE:   /85   Wt 124.5 kg (274 lb 5.8 oz)   LMP 02/03/2024 (Approximate)   BMI 47.09 kg/m²   APPEARANCE: Well nourished, well developed, in no acute distress.  NECK: Neck symmetric without masses or thyromegaly.  BREASTS: Symmetrical, no skin changes or visible lesions. No palpable masses, nipple discharge or adenopathy bilaterally.  ABDOMEN: Flat. Soft. No tenderness or masses. No hepatosplenomegaly. No hernias. No CVA tenderness.  VULVA: No lesions. Normal female genitalia.  URETHRAL MEATUS: Normal size and location, no lesions, no prolapse.  URETHRA: No masses, tenderness, prolapse or scarring.  VAGINA: Moist and well rugated, no discharge, no significant cystocele or rectocele.  CERVIX: No lesions and discharge. PAP done.  UTERUS: Normal size, regular shape,  mobile, non-tender, bladder base nontender.  ADNEXA: No masses, tenderness or CDS nodularity.  ANUS PERINEUM: Normal.    PROCEDURES:  Pap smear    Assessment:  Normal Gynecologic Exam    Plan:  Mammogram and Colonoscopy if indicated by current recommendations.  Return to clinic in one year or for any problems or complaints.  No gyn co  Cycles reg but only last 2-3 days  Good flow during cycle  , no spotting  Lmp feb 3,

## 2024-02-27 LAB
C TRACH DNA SPEC QL NAA+PROBE: NOT DETECTED
N GONORRHOEA DNA SPEC QL NAA+PROBE: NOT DETECTED

## 2024-02-29 LAB
FINAL PATHOLOGIC DIAGNOSIS: NORMAL
Lab: NORMAL

## 2024-03-11 ENCOUNTER — OFFICE VISIT (OUTPATIENT)
Dept: FAMILY MEDICINE | Facility: HOSPITAL | Age: 24
End: 2024-03-11
Payer: MEDICAID

## 2024-03-11 VITALS
HEIGHT: 64 IN | SYSTOLIC BLOOD PRESSURE: 124 MMHG | BODY MASS INDEX: 47.24 KG/M2 | WEIGHT: 276.69 LBS | DIASTOLIC BLOOD PRESSURE: 89 MMHG | HEART RATE: 71 BPM

## 2024-03-11 DIAGNOSIS — K64.9 HEMORRHOIDS, UNSPECIFIED HEMORRHOID TYPE: Primary | ICD-10-CM

## 2024-03-11 DIAGNOSIS — G43.019 INTRACTABLE MIGRAINE WITHOUT AURA AND WITHOUT STATUS MIGRAINOSUS: ICD-10-CM

## 2024-03-11 DIAGNOSIS — E66.01 CLASS 3 SEVERE OBESITY WITH BODY MASS INDEX (BMI) OF 45.0 TO 49.9 IN ADULT, UNSPECIFIED OBESITY TYPE, UNSPECIFIED WHETHER SERIOUS COMORBIDITY PRESENT: ICD-10-CM

## 2024-03-11 DIAGNOSIS — K59.00 CONSTIPATION, UNSPECIFIED CONSTIPATION TYPE: ICD-10-CM

## 2024-03-11 PROCEDURE — 99214 OFFICE O/P EST MOD 30 MIN: CPT | Performed by: STUDENT IN AN ORGANIZED HEALTH CARE EDUCATION/TRAINING PROGRAM

## 2024-03-11 RX ORDER — POLYETHYLENE GLYCOL 3350 17 G/17G
17 POWDER, FOR SOLUTION ORAL DAILY
Qty: 30 EACH | Refills: 2 | Status: SHIPPED | OUTPATIENT
Start: 2024-03-11 | End: 2024-04-25

## 2024-03-11 RX ORDER — NAPROXEN 500 MG/1
500 TABLET ORAL 2 TIMES DAILY PRN
Qty: 30 TABLET | Refills: 0 | Status: SHIPPED | OUTPATIENT
Start: 2024-03-11 | End: 2024-04-22 | Stop reason: SDUPTHER

## 2024-03-11 NOTE — PROGRESS NOTES
Progress Note  [facility]    Subjective:      Patient ID: Mason Tatum is a 24 y.o. female    Chief Complaint: Hemorrhoids    Mason Tatum is a 24-year-old female with a PMHx b/l cataracts since birth, obesity, migraine HA presenting for evaluation of hemorrhoids and obesity. She also requests a refill of naproxen for her migraine HA.     #hemorrhoids - She reports that she has had rectal pain and some bleeding for the past 3 months. She does report associated constipation and does not have daily bowel movements. She says that there are 2 hemorrhoids that she is aware of.  She denies any abdominal pain, bright bloody bowel movements, or melanotic stools.    # obesity - current BMI 47.49, weight 276 lb.Patient is interested in inventions to help obtain weight loss. She has tried different diets and exercise programs in the past.               Health Maintenance         Date Due Completion Date    COVID-19 Vaccine (1) Never done ---    TETANUS VACCINE 03/22/2020 3/22/2010    Influenza Vaccine (1) 09/01/2023 3/23/2011    Pap Smear 02/26/2027 2/26/2024            Review of Systems   Constitutional:  Negative for chills and fever.   HENT:  Negative for congestion, hearing loss and sore throat.    Eyes:  Negative for visual disturbance.   Respiratory:  Negative for shortness of breath.    Cardiovascular:  Negative for chest pain.   Gastrointestinal:  Negative for abdominal distention, abdominal pain, blood in stool, constipation, diarrhea, nausea and vomiting.   Genitourinary:  Negative for difficulty urinating, dysuria and frequency.   Musculoskeletal:  Negative for arthralgias and myalgias.   Skin:  Negative for rash.   Neurological:  Negative for dizziness, weakness, numbness and headaches.   Psychiatric/Behavioral:  Negative for dysphoric mood and sleep disturbance. The patient is not nervous/anxious.         Objective:      Vitals:    03/11/24 1638   BP: 124/89   Pulse: 71     BP Readings from Last 3  Encounters:   03/11/24 124/89   02/26/24 127/85   10/25/23 124/83     Body mass index is 47.49 kg/m².    Physical Exam  Constitutional:       General: She is not in acute distress.     Appearance: She is obese. She is not ill-appearing.   Cardiovascular:      Rate and Rhythm: Normal rate and regular rhythm.      Heart sounds: No murmur heard.     No friction rub. No gallop.   Pulmonary:      Breath sounds: No wheezing, rhonchi or rales.   Musculoskeletal:         General: Normal range of motion.      Right lower leg: No edema.      Left lower leg: No edema.   Neurological:      Mental Status: She is alert and oriented to person, place, and time.   Psychiatric:         Mood and Affect: Mood normal.         Behavior: Behavior normal.         Assessment/Plan:     Mason Tatum is a 24-year-old female with a PMHx b/l cataracts since birth, obesity presenting for evaluation of hemorrhoids, constipation, and obesity.    Hemorrhoids, unspecified hemorrhoid type         -     patient provided information packet on symptomatic treatment. Miralax for constipation.     Constipation, unspecified constipation type  -     polyethylene glycol (GLYCOLAX) 17 gram PwPk; Take 17 g by mouth once daily.  Dispense: 30 each; Refill: 2    Migraine without aura  -     naproxen (NAPROSYN) 500 MG tablet; Take 1 tablet (500 mg total) by mouth 2 (two) times daily as needed (Migraine HA).  Dispense: 30 tablet; Refill: 0  -     chronic issue currently controlled. Refill provided    Class 3 severe obesity with body mass index (BMI) of 45.0 to 49.9 in adult, unspecified obesity type, unspecified whether serious comorbidity present  -     Ambulatory referral/consult to Bariatric Surgery; Future; Expected date: 03/18/2024  -    BMI 47.49. Patient has had difficulties with her weight for multiple years. Plan for referral to bariatric surgery to discuss long term options.     Follow-up: 6-12 months or sooner if needed    DO DEIRDRE King  Family Medicine, PGY-3  03/11/2024      This note was partially created using [employer] Voice Recognition software. Typographical and content errors may occur with this process. While efforts are made to detect and correct such errors, in some cases errors will persist. For this reason, wording in this document should be considered in the proper context and not strictly verbatim

## 2024-03-19 ENCOUNTER — PATIENT MESSAGE (OUTPATIENT)
Dept: OBSTETRICS AND GYNECOLOGY | Facility: CLINIC | Age: 24
End: 2024-03-19
Payer: MEDICAID

## 2024-03-19 DIAGNOSIS — Z11.3 SCREENING EXAMINATION FOR VENEREAL DISEASE: Primary | ICD-10-CM

## 2024-03-22 ENCOUNTER — LAB VISIT (OUTPATIENT)
Dept: LAB | Facility: HOSPITAL | Age: 24
End: 2024-03-22
Attending: OBSTETRICS & GYNECOLOGY
Payer: MEDICAID

## 2024-03-22 DIAGNOSIS — Z01.419 WELL WOMAN EXAM WITH ROUTINE GYNECOLOGICAL EXAM: ICD-10-CM

## 2024-03-22 DIAGNOSIS — Z11.3 SCREENING EXAMINATION FOR VENEREAL DISEASE: ICD-10-CM

## 2024-03-22 LAB
FSH SERPL-ACNC: 6.11 MIU/ML
HIV 1+2 AB+HIV1 P24 AG SERPL QL IA: NORMAL
PROLACTIN SERPL IA-MCNC: 14 NG/ML (ref 5.2–26.5)
TSH SERPL DL<=0.005 MIU/L-ACNC: 1.55 UIU/ML (ref 0.4–4)

## 2024-03-22 PROCEDURE — 86592 SYPHILIS TEST NON-TREP QUAL: CPT | Performed by: OBSTETRICS & GYNECOLOGY

## 2024-03-22 PROCEDURE — 36415 COLL VENOUS BLD VENIPUNCTURE: CPT | Performed by: OBSTETRICS & GYNECOLOGY

## 2024-03-22 PROCEDURE — 83001 ASSAY OF GONADOTROPIN (FSH): CPT | Performed by: OBSTETRICS & GYNECOLOGY

## 2024-03-22 PROCEDURE — 84443 ASSAY THYROID STIM HORMONE: CPT | Performed by: OBSTETRICS & GYNECOLOGY

## 2024-03-22 PROCEDURE — 87389 HIV-1 AG W/HIV-1&-2 AB AG IA: CPT | Performed by: OBSTETRICS & GYNECOLOGY

## 2024-03-22 PROCEDURE — 84146 ASSAY OF PROLACTIN: CPT | Performed by: OBSTETRICS & GYNECOLOGY

## 2024-03-23 LAB — RPR SER QL: NORMAL

## 2024-03-28 DIAGNOSIS — E66.01 CLASS 3 SEVERE OBESITY WITH BODY MASS INDEX (BMI) OF 45.0 TO 49.9 IN ADULT, UNSPECIFIED OBESITY TYPE, UNSPECIFIED WHETHER SERIOUS COMORBIDITY PRESENT: Primary | ICD-10-CM

## 2024-04-22 ENCOUNTER — OFFICE VISIT (OUTPATIENT)
Dept: FAMILY MEDICINE | Facility: HOSPITAL | Age: 24
End: 2024-04-22
Payer: MEDICAID

## 2024-04-22 VITALS
HEART RATE: 76 BPM | WEIGHT: 268.5 LBS | SYSTOLIC BLOOD PRESSURE: 136 MMHG | HEIGHT: 64 IN | BODY MASS INDEX: 45.84 KG/M2 | DIASTOLIC BLOOD PRESSURE: 84 MMHG

## 2024-04-22 DIAGNOSIS — E66.01 CLASS 3 SEVERE OBESITY WITH BODY MASS INDEX (BMI) OF 45.0 TO 49.9 IN ADULT, UNSPECIFIED OBESITY TYPE, UNSPECIFIED WHETHER SERIOUS COMORBIDITY PRESENT: ICD-10-CM

## 2024-04-22 DIAGNOSIS — G43.019 INTRACTABLE MIGRAINE WITHOUT AURA AND WITHOUT STATUS MIGRAINOSUS: ICD-10-CM

## 2024-04-22 DIAGNOSIS — N92.6 IRREGULAR MENSTRUAL CYCLE: Primary | ICD-10-CM

## 2024-04-22 PROCEDURE — 99213 OFFICE O/P EST LOW 20 MIN: CPT | Performed by: STUDENT IN AN ORGANIZED HEALTH CARE EDUCATION/TRAINING PROGRAM

## 2024-04-22 RX ORDER — NAPROXEN 500 MG/1
500 TABLET ORAL 2 TIMES DAILY PRN
Qty: 30 TABLET | Refills: 0 | Status: SHIPPED | OUTPATIENT
Start: 2024-04-22

## 2024-04-22 RX ORDER — KETOCONAZOLE 20 MG/ML
SHAMPOO, SUSPENSION TOPICAL
COMMUNITY
Start: 2024-04-16 | End: 2024-04-25

## 2024-04-22 RX ORDER — CLOBETASOL PROPIONATE 0.46 MG/ML
SOLUTION TOPICAL
COMMUNITY
Start: 2024-04-17 | End: 2024-04-25

## 2024-04-22 RX ORDER — CLOBETASOL PROPIONATE 0.46 MG/ML
SOLUTION TOPICAL 2 TIMES DAILY
COMMUNITY
Start: 2024-03-07 | End: 2024-04-25

## 2024-04-22 NOTE — PROGRESS NOTES
"Progress Note  [facility]    Subjective:      Patient ID: Mason Tatum is a 24 y.o. female    Chief Complaint: Menstrual Problem    Mason Tatum is a 24-year-old female with a PMHx obesity, migraine HA presenting for evaluation of irregular cycles and concern for future infertility.  She also wants to address her obesity and needs refill of her medications for migraine headache.     #Irregular Cycles - occurring for multiple years in duration. Prior to a year ago in September 2023, she never had cycles. She will often have cycles that occur every 2 months and usually occur every 4 to 8 weeks. She endorses her cycles last around 2-3 days in duration and will occasional have blood clots.  She denies heavy cycles. She is concerned about infertility and her ability to have children in future. She currently wishes not to have any children now. Patient's LMP was 04/08/2024 that lasted for 1 day. She has recently been seen by her OBGYN. Patient denies any history of PCOS but does have comorbidity of obesity. She denies any symptoms of hyperandrogenism including acne, hirsutism, male pattern hair loss.     #Obesity - current BMI 46.09, weight 268 lb. Patient is interested in interventions and has been referred to Bariatric surgery. She endorses nobody has contacted her to establish care.  She has also tried different diets and exercise programs in the past.    # migraine HA - occurring for over 2 years in duration.  Headaches are unilateral located over the temporal region and last anywhere from a few hours to 24 hours. Endorses associated nausea and worsening of headaches with bright lights, loud noises. Characterizes the headaches as "throbbing" and will have to stay still and avoid stimulation to prevent worsening of the headaches. She has used ibuprofen in the past which does help relieve the headaches.  She endorses the headaches occur anywhere from 3-4 days per week.  She does say that she gets relief " from the headaches with naproxen.  Requesting refill today.          Health Maintenance         Date Due Completion Date    TETANUS VACCINE 03/22/2020 3/22/2010    Influenza Vaccine (1) 09/01/2023 3/23/2011    COVID-19 Vaccine (1 - 2023-24 season) Never done ---    Pap Smear 02/26/2027 2/26/2024            Review of Systems   Constitutional:  Negative for fatigue.   HENT:  Negative for congestion and sore throat.    Eyes:  Negative for visual disturbance.   Respiratory:  Negative for shortness of breath.    Cardiovascular:  Negative for chest pain.   Gastrointestinal:  Negative for abdominal pain, blood in stool, constipation, diarrhea, nausea and vomiting.   Genitourinary:  Positive for menstrual problem. Negative for difficulty urinating, dysuria and frequency.   Musculoskeletal:  Negative for arthralgias and myalgias.   Skin:  Negative for rash.   Neurological:  Positive for headaches. Negative for dizziness, weakness and numbness.   Psychiatric/Behavioral:  Negative for dysphoric mood and sleep disturbance. The patient is not nervous/anxious.         Objective:      Vitals:    04/22/24 1610   BP: 136/84   Pulse: 76     BP Readings from Last 3 Encounters:   04/22/24 136/84   03/11/24 124/89   02/26/24 127/85     Body mass index is 46.09 kg/m².    Physical Exam  Vitals reviewed.   Constitutional:       Appearance: She is obese.   HENT:      Head: Normocephalic and atraumatic.   Eyes:      Pupils: Pupils are equal, round, and reactive to light.   Cardiovascular:      Rate and Rhythm: Normal rate and regular rhythm.   Pulmonary:      Effort: Pulmonary effort is normal.      Breath sounds: Normal breath sounds.   Abdominal:      Palpations: Abdomen is soft.      Tenderness: There is no abdominal tenderness.   Musculoskeletal:         General: Normal range of motion.      Right lower leg: No edema.      Left lower leg: No edema.   Skin:     General: Skin is warm.      Findings: No rash.   Neurological:      Mental  Status: She is alert and oriented to person, place, and time.   Psychiatric:         Mood and Affect: Mood normal.         Behavior: Behavior normal.         Assessment/Plan:     Mason Tatum is a 24-year-old female with a PMHx obesity, migraine HA presenting for evaluation of irregular cycles and concern for future infertility. She also wants to address her obesity and needs refill of her medications for migraine headache.     Irregular menstrual cycle  -     US Pelvis Complete Non OB; Future; Expected date: 04/22/2024  -     PCOS should be suspected patients with cycle irregularity, signs and symptoms hyperandrogenism, and with obesity. Per [place] criteria, patient meets one of the criteria of cycle irregularity with does not have any signs of hyperandrogenism. Ultrasound is unnecessary unless the patient has only met one of the criteria. There is a need for diagnosis as PCOS increases risk for endometrial cancer due to unopposed estrogen. If patient is pursing pregnancy vs not will dictate future treatment. Continued effort at weight loss will ultimately help her issues.     Migraine without aura  -     naproxen (NAPROSYN) 500 MG tablet; Take 1 tablet (500 mg total) by mouth 2 (two) times daily as needed (Migraine HA).  Dispense: 30 tablet; Refill: 0    Class 3 severe obesity with body mass index (BMI) of 45.0 to 49.9 in adult, unspecified obesity type, unspecified whether serious comorbidity present  -     Ambulatory referral/consult to Bariatric Surgery; Future; Expected date: 04/29/2024    Follow-up: as needed and yearly    Hubert Alvarado DO  [facility], PGY-3  04/22/2024      This note was partially created using [employer] Voice Recognition software. Typographical and content errors may occur with this process. While efforts are made to detect and correct such errors, in some cases errors will persist. For this reason, wording in this document should be considered in the proper context and not  strictly verbatim

## 2024-04-24 ENCOUNTER — TELEPHONE (OUTPATIENT)
Dept: BARIATRICS | Facility: CLINIC | Age: 24
End: 2024-04-24
Payer: MEDICAID

## 2024-05-07 NOTE — PROGRESS NOTES
I assume primary medical responsibility for this patient. I have reviewed the history, physical, and assessement & treatment plan with the resident and agree that the care is reasonable and necessary. This service has been performed by a resident without the presence of a teaching physician under the primary care exception. If necessary, an addendum of additional findings or evaluation beyond the resident documentation will be noted below.    Patient concerned about fertility issues taking chronic NSAIDS not meeting criteria for PCOS. Agree with focus on weight loss. May also benefit from migraine prophylaxis to reduce NSIAD use possibly playing role in period/ovulation irregularity. Agree with further discussions with Obgyn with ongoing fertility concerns. Needs folic acid supplement.

## 2024-05-10 ENCOUNTER — TELEPHONE (OUTPATIENT)
Dept: FAMILY MEDICINE | Facility: HOSPITAL | Age: 24
End: 2024-05-10
Payer: MEDICAID

## 2024-05-10 NOTE — TELEPHONE ENCOUNTER
Called patient for follow-up. Reports she missed her appointment for ultrasound and will attempt to reschedule this. We also discussed her migraine headache and naproxen use and frequency. She also reports she is scheduled for bariatric surgery evaluation on 05/22/2024.    Hubert Alvarado,   [facility], PGY-3  05/10/2024

## 2024-06-23 ENCOUNTER — HOSPITAL ENCOUNTER (EMERGENCY)
Facility: HOSPITAL | Age: 24
Discharge: HOME | End: 2024-06-23
Attending: STUDENT IN AN ORGANIZED HEALTH CARE EDUCATION/TRAINING PROGRAM

## 2024-06-23 VITALS
BODY MASS INDEX: 42.91 KG/M2 | TEMPERATURE: 98 F | SYSTOLIC BLOOD PRESSURE: 165 MMHG | OXYGEN SATURATION: 97 % | HEART RATE: 78 BPM | WEIGHT: 250 LBS | DIASTOLIC BLOOD PRESSURE: 96 MMHG | RESPIRATION RATE: 18 BRPM

## 2024-06-23 DIAGNOSIS — K64.2 PROLAPSED INTERNAL HEMORRHOIDS REQUIRING MANUAL REDUCTION: Primary | ICD-10-CM

## 2024-06-23 PROCEDURE — 25000003 PHARM REV CODE 250: Performed by: STUDENT IN AN ORGANIZED HEALTH CARE EDUCATION/TRAINING PROGRAM

## 2024-06-23 PROCEDURE — 99284 EMERGENCY DEPT VISIT MOD MDM: CPT

## 2024-06-23 RX ORDER — LIDOCAINE HYDROCHLORIDE 20 MG/ML
JELLY TOPICAL
Status: COMPLETED | OUTPATIENT
Start: 2024-06-23 | End: 2024-06-23

## 2024-06-23 RX ORDER — HYDROCORTISONE 25 MG/G
CREAM TOPICAL 2 TIMES DAILY
Qty: 20 G | Refills: 0 | Status: CANCELLED | OUTPATIENT
Start: 2024-06-23

## 2024-06-23 RX ORDER — HYDROCORTISONE 25 MG/G
CREAM TOPICAL 2 TIMES DAILY
Qty: 20 G | Refills: 0 | Status: SHIPPED | OUTPATIENT
Start: 2024-06-23

## 2024-06-23 RX ORDER — DOCUSATE SODIUM 100 MG/1
100 CAPSULE, LIQUID FILLED ORAL 2 TIMES DAILY PRN
Qty: 30 CAPSULE | Refills: 0 | Status: SHIPPED | OUTPATIENT
Start: 2024-06-23

## 2024-06-23 RX ADMIN — LIDOCAINE HYDROCHLORIDE 5 ML: 20 JELLY TOPICAL at 08:06

## 2024-06-23 NOTE — ED PROVIDER NOTES
NAME:  Mason Tatum  CSN:     [id number]  MRN:    [id number]  ADMIT DATE: 6/23/2024        eMERGENCY dEPARTMENT eNCOUnter    CHIEF COMPLAINT    Chief Complaint   Patient presents with    Hemorrhoids     Patient reports having an external hemorrhoid that is painful and intermittently bleeds.        HPI    Mason Tatum is a 24 y.o. female with a past medical history of  has a past medical history of Cataracts, both eyes.     she presents to the ED due to rectal pain secondary to hemorrhoid.  She had a bowel movement yesterday and went to take a nap.  When she woke up she started having rectal pain.  She is tried tea tree pads, Sitz baths and preparation H without full improvement.  Has had a small hemorrhoid in the past but self-resolved.  States she attempted to reduce this herself but could not.    HPI       PAST MEDICAL HISTORY  Past Medical History:   Diagnosis Date    Cataracts, both eyes     from birth       SURGICAL HISTORY    Past Surgical History:   Procedure Laterality Date    WRIST SURGERY         FAMILY HISTORY    No family history on file.    SOCIAL HISTORY    Social History     Socioeconomic History    Marital status: Single   Tobacco Use    Smoking status: Never    Smokeless tobacco: Never   Substance and Sexual Activity    Alcohol use: No    Drug use: No       MEDICATIONS  Current Outpatient Medications   Medication Instructions    docusate sodium (COLACE) 100 mg, Oral, 2 times daily PRN    hydrocortisone 2.5 % cream Topical (Top), 2 times daily    naproxen (NAPROSYN) 500 mg, Oral, 2 times daily PRN    psyllium (HYDROCIL) packet 1 packet, Oral, Daily       ALLERGIES    Review of patient's allergies indicates:  No Known Allergies      REVIEW OF SYSTEMS   Review of Systems       PHYSICAL EXAM    Reviewed Triage Note    VITAL SIGNS:   ED Triage Vitals [06/23/24 0623]   Enc Vitals Group      BP (!) 165/96      Pulse 78      Resp 18      Temp 98.1 °F (36.7 °C)      Temp Source Oral      SpO2 97 %       Weight 250 lb      Height       Head Circumference       Peak Flow       Pain Score       Pain Loc       Pain Education       Exclude from Growth Chart        Patient Vitals for the past 24 hrs:   BP Temp Temp src Pulse Resp SpO2 Weight   06/23/24 0623 (!) 165/96 98.1 °F (36.7 °C) Oral 78 18 97 % 113.4 kg (250 lb)       Physical Exam    Nursing note and vitals reviewed.  Constitutional: She appears well-developed and well-nourished. She appears distressed.   Tearful secondary to pain   HENT:   Head: Normocephalic and atraumatic.   Eyes: EOM are normal. Pupils are equal, round, and reactive to light.   Neck: Neck supple.   Normal range of motion.  Cardiovascular:  Normal rate.           Pulmonary/Chest: No respiratory distress.   Abdominal: Abdomen is soft. There is no abdominal tenderness.   Genitourinary:    Genitourinary Comments: Prolapsed internal hemorrhoid appreciated.  Non thrombosed.  No active bleeding.  RN chaperone present     Musculoskeletal:         General: Normal range of motion.      Cervical back: Normal range of motion and neck supple.     Neurological: She is alert and oriented to person, place, and time.   Skin: Skin is warm and dry.   Psychiatric: She has a normal mood and affect.          EKG     Interpreted by EM physician if performed:               LABS  Pertinent labs reviewed. (See chart for details)   Labs Reviewed - No data to display      RADIOLOGY          Imaging Results    None           PROCEDURES    Procedures      ED COURSE & MEDICAL DECISION MAKING    Pertinent Labs & Imaging studies reviewed. (See chart for details and specific orders.)          Summary of review of records:       Medical Decision Making  Risk  OTC drugs.  Prescription drug management.      Mason Tatum is a 24 y.o. female who presents with rectal pain since yesterday afternoon with palpable hemorrhoid reported.  Trace bleeding noted.    Differential includes but is not limited to thrombosed hemorrhoid,  rectal prolapse, prolapsed internal hemorrhoid.  Low suspicion for acute blood loss anemia or obstruction.              Medications   LIDOcaine HCl 2% urojet (5 mLs Mucous Membrane Given 6/23/24 0848)          Presentation most consistent with prolapsed internal hemorrhoid.  No evidence of bleeding or thrombosis on exam.  Was able to successfully manually reduce prolapsed internal hernia with a little bit of applied pressure and does note that pain is somewhat improved.  Home care instructions provided with Anusol continued use of prep H, Sitz baths, Colace and colorectal referral provided.        FINAL IMPRESSION    Final diagnoses:  [K64.2] Prolapsed internal hemorrhoids requiring manual reduction (Primary)       DISPOSITION  Patient discharge in stable condition        ED Prescriptions       Medication Sig Dispense Start Date End Date Auth. Provider    docusate sodium (COLACE) 100 MG capsule Take 1 capsule (100 mg total) by mouth 2 (two) times daily as needed for Constipation. 30 capsule 6/23/2024 -- Lucretia Whitehead DO    psyllium (HYDROCIL) packet Take 1 packet by mouth once daily. 30 packet 6/23/2024 -- Lucretia Whitehead DO    hydrocortisone 2.5 % cream Apply topically 2 (two) times daily. 20 g 6/23/2024 -- Lucretia Whitehead DO          Follow-up Information       Follow up With Specialties Details Why Contact Info    Hubert Alvarado DO Family Medicine Schedule an appointment as soon as possible for a visit   [address]  [address]  [facility] [id number] [phone]      [facility] Emergency Medicine  As needed, If symptoms worsen [address] [facility] [address] [phone]              DISCLAIMER: This note was prepared with [employer] voice recognition transcription software. Garbled syntax, mangled pronouns, and other bizarre constructions may be attributed to that software system.             Lucretia Whitehead DO  06/23/24 [id number]

## 2024-06-23 NOTE — ED NOTES
Two patient identifiers have been checked and are correct.      Appearance: Pt awake, alert & oriented to person, place & time. Pt in no acute distress at present time. Pt is clean and well groomed with clothes appropriately fastened.   Skin: Skin warm, dry & intact. Color consistent with ethnicity. Mucous membranes moist. No breakdown or brusing noted.   Musculoskeletal: Patient moving all extremities well, no obvious swelling or deformities noted.   Abdomen: Abdomen soft, non-tender to palpation. Pt noted to have a external hemorrhoid, no bleeding noted.

## 2024-06-23 NOTE — DISCHARGE INSTRUCTIONS
Continue using preparation H, doing Sitz baths every 6 hours.  Use these steroid ointment twice daily and use Colace and fiber to help prevent recurrence.

## 2024-08-20 ENCOUNTER — TELEPHONE (OUTPATIENT)
Dept: SURGERY | Facility: CLINIC | Age: 24
End: 2024-08-20

## 2024-10-11 ENCOUNTER — LAB VISIT (OUTPATIENT)
Dept: LAB | Facility: HOSPITAL | Age: 24
End: 2024-10-11
Attending: NURSE PRACTITIONER
Payer: COMMERCIAL

## 2024-10-11 ENCOUNTER — OFFICE VISIT (OUTPATIENT)
Dept: FAMILY MEDICINE | Facility: CLINIC | Age: 24
End: 2024-10-11
Payer: COMMERCIAL

## 2024-10-11 VITALS
SYSTOLIC BLOOD PRESSURE: 116 MMHG | WEIGHT: 256.38 LBS | HEIGHT: 64 IN | HEART RATE: 73 BPM | BODY MASS INDEX: 43.77 KG/M2 | OXYGEN SATURATION: 98 % | DIASTOLIC BLOOD PRESSURE: 60 MMHG

## 2024-10-11 DIAGNOSIS — Z23 NEED FOR DIPHTHERIA-TETANUS-PERTUSSIS (TDAP) VACCINE: ICD-10-CM

## 2024-10-11 DIAGNOSIS — Z00.00 ANNUAL PHYSICAL EXAM: Primary | ICD-10-CM

## 2024-10-11 DIAGNOSIS — G43.009 MIGRAINE WITHOUT AURA AND WITHOUT STATUS MIGRAINOSUS, NOT INTRACTABLE: ICD-10-CM

## 2024-10-11 DIAGNOSIS — Z11.3 SCREENING FOR STDS (SEXUALLY TRANSMITTED DISEASES): ICD-10-CM

## 2024-10-11 DIAGNOSIS — E55.9 VITAMIN D DEFICIENCY: ICD-10-CM

## 2024-10-11 DIAGNOSIS — E66.01 MORBID OBESITY WITH BMI OF 40.0-44.9, ADULT: ICD-10-CM

## 2024-10-11 DIAGNOSIS — Z00.00 ANNUAL PHYSICAL EXAM: ICD-10-CM

## 2024-10-11 PROBLEM — R79.89 LOW SERUM PREALBUMIN: Status: ACTIVE | Noted: 2021-05-10

## 2024-10-11 PROBLEM — M54.6 ACUTE BILATERAL THORACIC BACK PAIN: Status: RESOLVED | Noted: 2021-12-27 | Resolved: 2024-10-11

## 2024-10-11 PROBLEM — K59.00 CONSTIPATION: Status: RESOLVED | Noted: 2019-09-16 | Resolved: 2024-10-11

## 2024-10-11 LAB
B-HCG UR QL: NEGATIVE
BILIRUB UR QL STRIP: NEGATIVE
CLARITY UR REFRACT.AUTO: ABNORMAL
COLOR UR AUTO: YELLOW
GLUCOSE UR QL STRIP: NEGATIVE
HGB UR QL STRIP: NEGATIVE
KETONES UR QL STRIP: NEGATIVE
LEUKOCYTE ESTERASE UR QL STRIP: NEGATIVE
NITRITE UR QL STRIP: NEGATIVE
PH UR STRIP: 7 [PH] (ref 5–8)
PROT UR QL STRIP: NEGATIVE
SP GR UR STRIP: 1.02 (ref 1–1.03)
URN SPEC COLLECT METH UR: ABNORMAL

## 2024-10-11 PROCEDURE — 81025 URINE PREGNANCY TEST: CPT | Performed by: NURSE PRACTITIONER

## 2024-10-11 PROCEDURE — 81003 URINALYSIS AUTO W/O SCOPE: CPT | Performed by: NURSE PRACTITIONER

## 2024-10-11 PROCEDURE — 99999 PR PBB SHADOW E&M-EST. PATIENT-LVL IV: CPT | Mod: PBBFAC,,, | Performed by: NURSE PRACTITIONER

## 2024-10-11 PROCEDURE — 87591 N.GONORRHOEAE DNA AMP PROB: CPT | Performed by: NURSE PRACTITIONER

## 2024-10-11 PROCEDURE — 87491 CHLMYD TRACH DNA AMP PROBE: CPT | Performed by: NURSE PRACTITIONER

## 2024-10-11 NOTE — PROGRESS NOTES
"Subjective     Patient ID: Mason Tatum is a 24 y.o. female.    Chief Complaint: Annual Exam    HPI    This is a 24 y.o. yo female patient, not currently established with a PCP, who is new to me. She presents today with c/o Annual Exam  - PMH includes    Patient Active Problem List   Diagnosis    Low serum prealbumin    Vitamin D deficiency     VSS today. Here for routine check-up. Eating some fruits/vegetables. Avoids fatty/fried foods. Exercising about 4 days per week by walking for 10-15 minutes. Admits that she is not eating much; skips breakfast and lunch and typically has cereal when she gets home from work. Drinks tea throughout the day. Drinks plenty water daily. Typically avoids caffeine. Works as a  5 days/week, 9 hour shifts. Does not smoke. Denies alcohol or drug use.     Having unprotected sex with one sexual partner, denies any other partners in the past 6 months. Requesting STD/UPT testing today. Reports normal periods with no pelvic pain or spotting between cycles, LMP 9/14/24. Last well woman exam done earlier this year. Due for Tdap, flu and COVID vaccines. Wears glasses; had eye exam done at external facility.    Reports ongoing issues with left-sided HAs that wax and wane. Felt only to left side, with associated phonophobia/photophobia. Denies N/V. Describes as "sharp" at times but is fully relieved with naproxen. Denies visual changes or dizziness with HA. Also occasionally feels left ear pain with HAs, but ear pain resolves once HA is relieved.    Review of Systems   Neurological:  Positive for headaches.     Otherwise negative     Objective     Physical Exam  Vitals reviewed.   Constitutional:       General: She is awake. She is not in acute distress.     Appearance: Normal appearance. She is well-developed and well-groomed. She is morbidly obese.   HENT:      Head: Normocephalic and atraumatic.      Right Ear: Tympanic membrane, ear canal and external ear normal.      " Left Ear: Tympanic membrane, ear canal and external ear normal.      Nose: Nose normal.      Mouth/Throat:      Mouth: Mucous membranes are moist.      Pharynx: No posterior oropharyngeal erythema.   Eyes:      General:         Right eye: No discharge.         Left eye: No discharge.      Extraocular Movements: Extraocular movements intact.      Pupils: Pupils are equal, round, and reactive to light.   Neck:      Vascular: No carotid bruit.   Cardiovascular:      Rate and Rhythm: Normal rate and regular rhythm.      Pulses:           Carotid pulses are 2+ on the right side and 2+ on the left side.       Radial pulses are 2+ on the right side and 2+ on the left side.        Dorsalis pedis pulses are 2+ on the right side and 2+ on the left side.        Posterior tibial pulses are 2+ on the right side and 2+ on the left side.      Heart sounds: Normal heart sounds, S1 normal and S2 normal. No murmur heard.  Pulmonary:      Effort: Pulmonary effort is normal. No respiratory distress.      Breath sounds: Normal breath sounds. No wheezing or rhonchi.   Abdominal:      General: Bowel sounds are normal. There is no distension.      Palpations: Abdomen is soft.      Tenderness: There is no abdominal tenderness. There is no guarding or rebound.   Musculoskeletal:      Right lower leg: No edema.      Left lower leg: No edema.   Lymphadenopathy:      Cervical: No cervical adenopathy.   Skin:     General: Skin is warm and dry.      Capillary Refill: Capillary refill takes less than 2 seconds.      Coloration: Skin is not pale.   Neurological:      Mental Status: She is alert and oriented to person, place, and time.      Coordination: Coordination normal.      Gait: Gait normal.      Comments: CN II-XII grossly intact   Psychiatric:         Attention and Perception: Attention normal.         Mood and Affect: Mood and affect normal.         Speech: Speech normal.         Behavior: Behavior normal. Behavior is cooperative.          Thought Content: Thought content normal.         Cognition and Memory: Cognition normal.      Comments: PHQ-2 score of 1 today  Denies SI/HI            Assessment and Plan     1. Annual physical exam  -     CBC Auto Differential; Future; Expected date: 10/11/2024  -     Comprehensive Metabolic Panel; Future; Expected date: 10/11/2024  -     Lipid Panel; Future; Expected date: 10/11/2024  -     TSH; Future; Expected date: 10/11/2024  -     Urinalysis; Future; Expected date: 10/11/2024  -     Pregnancy, urine rapid; Future; Expected date: 10/11/2024    2. Migraine without aura and without status migrainosus, not intractable  - HAs appear to be consistent with migraine type; provided tips on ways to prevent migraines, will also provide handouts  - OK to continue using naproxen PRN migraines  - Warning signs discussed and ED precautions given    3. Vitamin D deficiency  -     Vitamin D; Future; Expected date: 10/11/2024    4. Screening for STDs (sexually transmitted diseases)  -     HIV 1/2 Ag/Ab (4th Gen); Future; Expected date: 10/11/2024  -     C. trachomatis/N. gonorrhoeae by AMP DNA; Future; Expected date: 10/11/2024  -     Treponema Pallidium Antibodies IgG, IgM; Future; Expected date: 10/11/2024  -     Hepatitis C Antibody; Future; Expected date: 10/11/2024  -     Hepatitis B Surface Antigen; Future; Expected date: 10/11/2024  - Safe sex practices discussed    5. Need for diphtheria-tetanus-pertussis (Tdap) vaccine  -     Tdap (BOOSTRIX) vaccine injection 0.5 mL    6. Morbid obesity with BMI of 40.0-44.9, adult  Eat a well-balanced, heart-healthy diet and discussed importance of engaging in physical activity at least 5x/week for a minimum of 30 min/day      - Declines COVID and flu vaccines today           Follow up if symptoms worsen or fail to improve.        I spent a total of 40 minutes on the day of the visit. This includes face to face time and non-face to face time preparing to see the patient (eg, review  of tests), obtaining and/or reviewing separately obtained history, documenting clinical information in the electronic or other health record, independently interpreting results and communicating results to the patient/family/caregiver, or care coordinator.        (Portions of this note were dictated using voice recognition software and may contain dictation related errors in spelling/grammar/syntax not found on text review)

## 2024-10-13 LAB
C TRACH DNA SPEC QL NAA+PROBE: NOT DETECTED
N GONORRHOEA DNA SPEC QL NAA+PROBE: NOT DETECTED

## 2025-06-19 ENCOUNTER — APPOINTMENT (OUTPATIENT)
Dept: LAB | Facility: HOSPITAL | Age: 25
End: 2025-06-19
Attending: NURSE PRACTITIONER

## 2025-06-19 ENCOUNTER — OFFICE VISIT (OUTPATIENT)
Dept: FAMILY MEDICINE | Facility: CLINIC | Age: 25
End: 2025-06-19

## 2025-06-19 VITALS
HEART RATE: 87 BPM | WEIGHT: 267.5 LBS | BODY MASS INDEX: 45.67 KG/M2 | OXYGEN SATURATION: 98 % | HEIGHT: 64 IN | DIASTOLIC BLOOD PRESSURE: 70 MMHG | SYSTOLIC BLOOD PRESSURE: 118 MMHG

## 2025-06-19 DIAGNOSIS — E55.9 VITAMIN D DEFICIENCY: ICD-10-CM

## 2025-06-19 DIAGNOSIS — Z11.3 SCREENING FOR STDS (SEXUALLY TRANSMITTED DISEASES): ICD-10-CM

## 2025-06-19 DIAGNOSIS — L29.9 PRURITUS: ICD-10-CM

## 2025-06-19 DIAGNOSIS — E66.01 MORBID OBESITY WITH BMI OF 45.0-49.9, ADULT: ICD-10-CM

## 2025-06-19 DIAGNOSIS — G43.019 INTRACTABLE MIGRAINE WITHOUT AURA AND WITHOUT STATUS MIGRAINOSUS: Primary | ICD-10-CM

## 2025-06-19 DIAGNOSIS — R53.83 FATIGUE, UNSPECIFIED TYPE: ICD-10-CM

## 2025-06-19 PROCEDURE — 99213 OFFICE O/P EST LOW 20 MIN: CPT | Mod: PBBFAC,PO | Performed by: NURSE PRACTITIONER

## 2025-06-19 PROCEDURE — 99214 OFFICE O/P EST MOD 30 MIN: CPT | Mod: S$PBB,,, | Performed by: NURSE PRACTITIONER

## 2025-06-19 PROCEDURE — 99999 PR PBB SHADOW E&M-EST. PATIENT-LVL III: CPT | Mod: PBBFAC,,, | Performed by: NURSE PRACTITIONER

## 2025-06-19 RX ORDER — NAPROXEN 500 MG/1
500 TABLET ORAL 2 TIMES DAILY PRN
Qty: 30 TABLET | Refills: 0 | Status: SHIPPED | OUTPATIENT
Start: 2025-06-19

## 2025-06-19 RX ORDER — TRAMADOL HYDROCHLORIDE 50 MG/1
50 TABLET, FILM COATED ORAL EVERY 6 HOURS PRN
COMMUNITY
Start: 2025-04-08

## 2025-06-20 ENCOUNTER — RESULTS FOLLOW-UP (OUTPATIENT)
Dept: FAMILY MEDICINE | Facility: CLINIC | Age: 25
End: 2025-06-20

## 2025-06-20 NOTE — PROGRESS NOTES
Subjective     Patient ID: Mason Tatum is a 25 y.o. female.    Chief Complaint: Headache    History of Present Illness    CHIEF COMPLAINT:  Ms. Tatum presents today with concerns about night sweats and leg itching    HISTORY OF PRESENT ILLNESS:  She reports generalized skin itching for two weeks, primarily affecting bilateral lower extremities with occasional involvement of bilateral upper extremities. She describes scratching to the point of creating skin marks, but denies visible rash, bumps, or redness. Tea tree oil provided some relief but was discontinued due to unpleasant odor. She has not tried OTC anti-itch treatments. The itching occurs without apparent trigger. She also reports intermittent night sweats localized to neck and chest area, occurring sporadically with mild sweating that does not saturate clothes or bedding. She denies unintentional weight loss, cough, fever, chills, N/V. Admits that she does not drink much water. She is also taking hot, steamy showers daily.     ASSOCIATED SYMPTOMS:  She experiences severe headaches every other day, currently managed with Naproxen which provides relief. She reports persistent low energy levels and significant fatigue despite Vitamin D supplementation for previously diagnosed Vitamin D deficiency. She frequently naps after work and sleeps through the night.    GASTROINTESTINAL:  She reports excessive gas and consumes dairy milk daily with morning cereal. She maintains normal bowel movements and appetite.    MENSTRUAL HISTORY:  She reports regular menstrual cycles lasting two days with normal flow and no heavy bleeding or clots. Last menstrual period was June 7th-8th.    SOCIAL HISTORY:  She denies alcohol and tobacco use. She has been in a monogamous relationship with the same sexual partner for four years.            Problem List[1]    Review of Systems  Otherwise negative       Objective     Physical Exam  Vitals reviewed.   Constitutional:        General: She is awake. She is not in acute distress.     Appearance: Normal appearance. She is well-developed and well-groomed. She is morbidly obese.   HENT:      Head: Normocephalic and atraumatic.      Right Ear: External ear normal.      Left Ear: External ear normal.   Eyes:      General:         Right eye: No discharge.         Left eye: No discharge.   Cardiovascular:      Rate and Rhythm: Normal rate and regular rhythm.      Pulses:           Radial pulses are 2+ on the right side and 2+ on the left side.        Dorsalis pedis pulses are 2+ on the right side and 2+ on the left side.      Heart sounds: Normal heart sounds, S1 normal and S2 normal. No murmur heard.  Pulmonary:      Effort: Pulmonary effort is normal. No respiratory distress.      Breath sounds: Normal breath sounds. No wheezing or rhonchi.   Abdominal:      General: There is no distension.   Skin:     General: Skin is warm and dry.      Coloration: Skin is not pale.      Findings: No erythema or rash.   Neurological:      Mental Status: She is alert and oriented to person, place, and time.      Coordination: Coordination normal.      Gait: Gait normal.      Comments: CN II-XII grossly intact   Psychiatric:         Attention and Perception: Attention normal.         Mood and Affect: Mood and affect normal.         Speech: Speech normal.         Behavior: Behavior normal. Behavior is cooperative.         Thought Content: Thought content normal.            Assessment and Plan     1. Migraine without aura  -     naproxen (NAPROSYN) 500 MG tablet; Take 1 tablet (500 mg total) by mouth 2 (two) times daily as needed (Migraine HA).  Dispense: 30 tablet; Refill: 0    2. Pruritus    3. Vitamin D deficiency  -     CBC Auto Differential; Future; Expected date: 06/19/2025  -     Comprehensive Metabolic Panel; Future; Expected date: 06/19/2025  -     Vitamin D; Future; Expected date: 06/19/2025    4. Fatigue, unspecified type  -     CBC Auto Differential;  Future; Expected date: 06/19/2025  -     Comprehensive Metabolic Panel; Future; Expected date: 06/19/2025  -     Vitamin D; Future; Expected date: 06/19/2025    5. Screening for STDs (sexually transmitted diseases)  -     C. trachomatis/N. gonorrhoeae by AMP DNA [facility]; Urine; Future; Expected date: 06/19/2025  -     Hepatitis C Antibody; Future; Expected date: 06/19/2025  -     HIV 1/2 Ag/Ab (4th Gen); Future; Expected date: 06/19/2025  -     Hepatitis B Surface Antigen; Future; Expected date: 06/19/2025  -     Treponema Pallidium Antibodies IgG, IgM; Future; Expected date: 06/19/2025    6. Morbid obesity with BMI of 45.0-49.9, adult               Assessment & Plan    PRURITUS (ITCHING):  - Monitored patient's itching on legs and sometimes arms for 2 weeks with no visible rash confirmed on exam.  - Assessed likely causes as hot showers and lack of moisturizer.  - Advised using lukewarm water for showers with possible cold water exposure at the end, and applying thick moisturizer immediately after showering while skin is still damp.  - Recommend OTC antihistamines (e.g., Zyrtec, Claritin) at night if itching persists after lifestyle changes.  - Ms. Pleasant instructed to contact office if symptoms continue despite interventions.    MIGRAINE WITHOUT AURA:  - Monitored headaches occurring every other day  - Discussed contributing factors including sleep quality, hydration, diet, stress, and screen time.  - Instructed patient to keep a migraine diary to track frequency and potential triggers.  - Continued Naproxen for headache management.  - Scheduled 3-month follow-up to reassess condition.    VITAMIN D DEFICIENCY:  - Monitored low energy levels and tiredness despite Vitamin D supplementation.  - Ordered comprehensive lab panel including vitamin D level to assess overall health status and rule out underlying conditions.  - Advised continuing current supplementation until levels can be rechecked.    FATIGUE:  -  Monitored tiredness and low energy levels requiring daytime napping.  - Evaluated that patient feels tired despite nighttime sleep, possibly related to sleep apnea or other factors.  - Provided information on sleep apnea symptoms and diagnostic process.  - Advised avoiding daytime naps to improve nighttime sleep quality.    LACTOSE INTOLERANCE:  - Suspected mild lactose intolerance based on excessive gas symptoms and daily consumption of regular milk with cereal.  - Advised trial of lactose-free milk or plant-based alternatives (e.g., almond milk, oat milk).  - Recommend lactase supplements as an alternative if patient prefers to continue with regular dairy prod  ucts.    GENERAL HEALTH RECOMMENDATIONS:  - Recommend increasing consistency with exercise routine and maintaining a balanced diet with fruits, vegetables, lean proteins, and whole grains.  - Ordered comprehensive lab panel including renal and liver function, anemia levels, electrolytes, infectious disease screening (HIV, hepatitis B and C, gonorrhea, chlamydia, syphilis), and urine tests.  - Ms. Tatum advised to review lab results in patient portal within a few days and to establish care with a primary care physician at the [facility] on [address].          Follow up if symptoms worsen or fail to improve.        I spent a total of 30 minutes on the day of the visit. This includes face to face time and non-face to face time preparing to see the patient (eg, review of tests), obtaining and/or reviewing separately obtained history, documenting clinical information in the electronic or other health record, independently interpreting results and communicating results to the patient/family/caregiver, or care coordinator.        (Portions of this note may have been dictated using voice recognition software and may contain dictation related errors in spelling/grammar/syntax not found on text review)     This note was generated with the assistance of  ambient listening technology. Verbal consent was obtained by the patient and accompanying visitor(s) for the recording of patient appointment to facilitate this note. I attest to having reviewed and edited the generated note for accuracy, though some syntax or spelling errors may persist. Please contact the author of this note for any clarification.              [1]   Patient Active Problem List  Diagnosis    Low serum prealbumin    Vitamin D deficiency    Migraine without aura and without status migrainosus, not intractable    Morbid obesity with BMI of 45.0-49.9, adult

## 2025-07-07 ENCOUNTER — OFFICE VISIT (OUTPATIENT)
Dept: OBSTETRICS AND GYNECOLOGY | Facility: CLINIC | Age: 25
End: 2025-07-07

## 2025-07-07 VITALS
WEIGHT: 268.75 LBS | BODY MASS INDEX: 46.13 KG/M2 | DIASTOLIC BLOOD PRESSURE: 82 MMHG | SYSTOLIC BLOOD PRESSURE: 119 MMHG

## 2025-07-07 DIAGNOSIS — Z01.419 WELL WOMAN EXAM WITH ROUTINE GYNECOLOGICAL EXAM: Primary | ICD-10-CM

## 2025-07-07 DIAGNOSIS — Z11.3 SCREENING FOR STD (SEXUALLY TRANSMITTED DISEASE): ICD-10-CM

## 2025-07-07 PROCEDURE — 88175 CYTOPATH C/V AUTO FLUID REDO: CPT | Mod: TC | Performed by: OBSTETRICS & GYNECOLOGY

## 2025-07-07 PROCEDURE — 99213 OFFICE O/P EST LOW 20 MIN: CPT | Mod: PBBFAC,PO | Performed by: OBSTETRICS & GYNECOLOGY

## 2025-07-07 PROCEDURE — 99999 PR PBB SHADOW E&M-EST. PATIENT-LVL III: CPT | Mod: PBBFAC,,, | Performed by: OBSTETRICS & GYNECOLOGY

## 2025-07-07 PROCEDURE — 87591 N.GONORRHOEAE DNA AMP PROB: CPT | Performed by: OBSTETRICS & GYNECOLOGY

## 2025-07-07 NOTE — PROGRESS NOTES
CC: Annual check-up    SUBJECTIVE:   25 y.o. female No obstetric history on file.  for annual routine Pap and checkup. Patient's last menstrual period was 06/07/2025 (exact date)..  She has no unusual complaints and has a 2 day cycle q month.        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  OB History   No obstetric history on file.         Current Medications[2]  Allergies: Patient has no known allergies.     ROS:  Constitutional: no weight loss, weight gain, fever, fatigue  Eyes:  No vision changes, glasses/contacts  ENT/Mouth: No ulcers, sinus problems, ears ringing, headache  Cardiovascular: No inability to lie flat, chest pain, exercise intolerance, swelling, heart palpitations  Respiratory: No wheezing, coughing blood, shortness of breath, or cough  Gastrointestinal: No diarrhea, bloody stool, nausea/vomiting, constipation, gas, hemorrhoids  Genitourinary: No blood in urine, painful urination, urgency of urination, frequency of urination, incomplete emptying, incontinence, abnormal bleeding, painful periods, heavy periods, vaginal discharge, vaginal odor, painful intercourse, sexual problems, bleeding after intercourse.  Musculoskeletal: No muscle weakness  Skin/Breast: No painful breasts, nipple discharge, masses, rash, ulcers  Neurological: No passing out, seizures, numbness, headache  Endocrine: No diabetes, hypothyroid, hyperthyroid, hot flashes, hair loss, abnormal hair growth, ance  Psychiatric: No depression, crying  Hematologic: No bruises, bleeding, swollen lymph nodes, anemia.      OBJECTIVE:   The patient appears well, alert, oriented x 3, in no distress.  /82   Wt 121.9 kg (268 lb 11.9 oz)   LMP 06/07/2025 (Exact Date)   BMI 46.13 kg/m²   NECK: no thyromegaly, trachea midline  SKIN: no acne, striae, hirsutism  BREAST EXAM: not  examined  ABDOMEN: no hernias, masses, or hepatosplenomegaly  GENITALIA: normal external genitalia, no erythema, no discharge  URETHRA: normal urethra, normal urethral meatus  VAGINA: Normal  CERVIX: no lesions or cervical motion tenderness  UTERUS: normal  ADNEXA: normal adnexa and no mass, fullness, tenderness      ASSESSMENT:   well woman  1. Well woman exam with routine gynecological exam        PLAN:   pap smear  additional lab tests per orders  return annually or prn  Monitor cycles if irreg can rx ocp's but pt not currently interested            [1]   Social History  Socioeconomic History    Marital status: Single   Tobacco Use    Smoking status: Never    Smokeless tobacco: Never   Substance and Sexual Activity    Alcohol use: No    Drug use: No   [2]   Current Outpatient Medications   Medication Sig Dispense Refill    naproxen (NAPROSYN) 500 MG tablet Take 1 tablet (500 mg total) by mouth 2 (two) times daily as needed (Migraine HA). 30 tablet 0    traMADoL (ULTRAM) 50 mg tablet Take 50 mg by mouth every 6 (six) hours as needed.       No current facility-administered medications for this visit.

## 2025-07-09 LAB
INSULIN SERPL-ACNC: NORMAL U[IU]/ML
LAB AP BETHESDA CATEGORY: NORMAL
LAB AP CLINICAL FINDINGS: NORMAL
LAB AP LMP DATE: NORMAL
LAB AP OCHS PAP SPECIMEN ADEQUACY: NORMAL
LAB AP OHS PAP INTERPRETATION: NORMAL
LAB AP PAP DISCLAIMER COMMENTS: NORMAL
LAB AP PERFORMING LOCATION(S): NORMAL

## 2025-07-10 LAB
C TRACH DNA SPEC QL NAA+PROBE: NOT DETECTED
CTGC SOURCE (OHS) ORD-325: NORMAL
N GONORRHOEA DNA UR QL NAA+PROBE: NOT DETECTED